# Patient Record
Sex: FEMALE | Race: BLACK OR AFRICAN AMERICAN | NOT HISPANIC OR LATINO | Employment: UNEMPLOYED | URBAN - METROPOLITAN AREA
[De-identification: names, ages, dates, MRNs, and addresses within clinical notes are randomized per-mention and may not be internally consistent; named-entity substitution may affect disease eponyms.]

---

## 2023-09-14 ENCOUNTER — HOSPITAL ENCOUNTER (INPATIENT)
Facility: CLINIC | Age: 33
LOS: 1 days | Discharge: HOME | End: 2023-09-15
Attending: EMERGENCY MEDICINE | Admitting: INTERNAL MEDICINE
Payer: MEDICAID

## 2023-09-14 ENCOUNTER — APPOINTMENT (OUTPATIENT)
Dept: CT IMAGING | Facility: CLINIC | Age: 33
End: 2023-09-14
Attending: EMERGENCY MEDICINE
Payer: MEDICAID

## 2023-09-14 DIAGNOSIS — K76.7 HEPATORENAL FAILURE (H): ICD-10-CM

## 2023-09-14 DIAGNOSIS — K76.7 HEPATORENAL SYNDROME (H): Primary | ICD-10-CM

## 2023-09-14 DIAGNOSIS — F10.10 ALCOHOL ABUSE: ICD-10-CM

## 2023-09-14 DIAGNOSIS — I10 HYPERTENSION, UNSPECIFIED TYPE: ICD-10-CM

## 2023-09-14 LAB
ALBUMIN SERPL BCG-MCNC: 5.6 G/DL (ref 3.5–5.2)
ALBUMIN UR-MCNC: 70 MG/DL
ALP SERPL-CCNC: 138 U/L (ref 35–104)
ALT SERPL W P-5'-P-CCNC: 907 U/L (ref 0–50)
ANION GAP SERPL CALCULATED.3IONS-SCNC: 17 MMOL/L (ref 7–15)
ANION GAP SERPL CALCULATED.3IONS-SCNC: 38 MMOL/L (ref 7–15)
APAP SERPL-MCNC: <5 UG/ML (ref 10–30)
APPEARANCE UR: CLEAR
AST SERPL W P-5'-P-CCNC: 1421 U/L (ref 0–45)
B-OH-BUTYR SERPL-SCNC: 4.3 MMOL/L
BASOPHILS # BLD AUTO: 0 10E3/UL (ref 0–0.2)
BASOPHILS NFR BLD AUTO: 0 %
BILIRUB SERPL-MCNC: 0.4 MG/DL
BILIRUB UR QL STRIP: ABNORMAL
BUN SERPL-MCNC: 21.9 MG/DL (ref 6–20)
BUN SERPL-MCNC: 31.9 MG/DL (ref 6–20)
CALCIUM SERPL-MCNC: 11.1 MG/DL (ref 8.6–10)
CALCIUM SERPL-MCNC: 8.3 MG/DL (ref 8.6–10)
CHLORIDE SERPL-SCNC: 81 MMOL/L (ref 98–107)
CHLORIDE SERPL-SCNC: 96 MMOL/L (ref 98–107)
COLOR UR AUTO: YELLOW
CREAT SERPL-MCNC: 1.18 MG/DL (ref 0.51–0.95)
CREAT SERPL-MCNC: 3.6 MG/DL (ref 0.51–0.95)
CREAT UR-MCNC: 210.2 MG/DL
DEPRECATED HCO3 PLAS-SCNC: 13 MMOL/L (ref 22–29)
DEPRECATED HCO3 PLAS-SCNC: 21 MMOL/L (ref 22–29)
EGFRCR SERPLBLD CKD-EPI 2021: 17 ML/MIN/1.73M2
EGFRCR SERPLBLD CKD-EPI 2021: 63 ML/MIN/1.73M2
EOSINOPHIL # BLD AUTO: 0 10E3/UL (ref 0–0.7)
EOSINOPHIL NFR BLD AUTO: 0 %
ERYTHROCYTE [DISTWIDTH] IN BLOOD BY AUTOMATED COUNT: 12.6 % (ref 10–15)
FRACT EXCRET NA UR+SERPL-RTO: 0.3 %
GLUCOSE SERPL-MCNC: 149 MG/DL (ref 70–99)
GLUCOSE SERPL-MCNC: 78 MG/DL (ref 70–99)
GLUCOSE UR STRIP-MCNC: NEGATIVE MG/DL
HCT VFR BLD AUTO: 47.9 % (ref 35–47)
HGB BLD-MCNC: 16.8 G/DL (ref 11.7–15.7)
HGB UR QL STRIP: ABNORMAL
IMM GRANULOCYTES # BLD: 0 10E3/UL
IMM GRANULOCYTES NFR BLD: 0 %
INR PPP: 0.93 (ref 0.85–1.15)
KETONES UR STRIP-MCNC: 100 MG/DL
LACTATE SERPL-SCNC: 0.9 MMOL/L (ref 0.7–2)
LEUKOCYTE ESTERASE UR QL STRIP: NEGATIVE
LIPASE SERPL-CCNC: 39 U/L (ref 13–60)
LYMPHOCYTES # BLD AUTO: 1.3 10E3/UL (ref 0.8–5.3)
LYMPHOCYTES NFR BLD AUTO: 21 %
MAGNESIUM SERPL-MCNC: 2 MG/DL (ref 1.7–2.3)
MCH RBC QN AUTO: 31.2 PG (ref 26.5–33)
MCHC RBC AUTO-ENTMCNC: 35.1 G/DL (ref 31.5–36.5)
MCV RBC AUTO: 89 FL (ref 78–100)
MONOCYTES # BLD AUTO: 0.4 10E3/UL (ref 0–1.3)
MONOCYTES NFR BLD AUTO: 6 %
MUCOUS THREADS #/AREA URNS LPF: PRESENT /LPF
NEUTROPHILS # BLD AUTO: 4.6 10E3/UL (ref 1.6–8.3)
NEUTROPHILS NFR BLD AUTO: 73 %
NITRATE UR QL: NEGATIVE
NRBC # BLD AUTO: 0 10E3/UL
NRBC BLD AUTO-RTO: 0 /100
PH UR STRIP: 6 [PH] (ref 5–7)
PHOSPHATE SERPL-MCNC: 5.3 MG/DL (ref 2.5–4.5)
PLATELET # BLD AUTO: 271 10E3/UL (ref 150–450)
POTASSIUM SERPL-SCNC: 3.1 MMOL/L (ref 3.4–5.3)
POTASSIUM SERPL-SCNC: 3.3 MMOL/L (ref 3.4–5.3)
POTASSIUM SERPL-SCNC: 3.4 MMOL/L (ref 3.4–5.3)
PROT SERPL-MCNC: 10.2 G/DL (ref 6.4–8.3)
RBC # BLD AUTO: 5.38 10E6/UL (ref 3.8–5.2)
RBC URINE: 1 /HPF
SODIUM SERPL-SCNC: 132 MMOL/L (ref 136–145)
SODIUM SERPL-SCNC: 134 MMOL/L (ref 136–145)
SODIUM UR-SCNC: 72 MMOL/L
SP GR UR STRIP: 1.02 (ref 1–1.03)
SQUAMOUS EPITHELIAL: 5 /HPF
UROBILINOGEN UR STRIP-MCNC: 2 MG/DL
WBC # BLD AUTO: 6.3 10E3/UL (ref 4–11)
WBC URINE: 1 /HPF

## 2023-09-14 PROCEDURE — 80053 COMPREHEN METABOLIC PANEL: CPT | Performed by: EMERGENCY MEDICINE

## 2023-09-14 PROCEDURE — 85610 PROTHROMBIN TIME: CPT | Performed by: EMERGENCY MEDICINE

## 2023-09-14 PROCEDURE — 250N000011 HC RX IP 250 OP 636: Mod: JZ | Performed by: PHYSICIAN ASSISTANT

## 2023-09-14 PROCEDURE — 86708 HEPATITIS A ANTIBODY: CPT | Performed by: INTERNAL MEDICINE

## 2023-09-14 PROCEDURE — 82310 ASSAY OF CALCIUM: CPT | Performed by: INTERNAL MEDICINE

## 2023-09-14 PROCEDURE — 36415 COLL VENOUS BLD VENIPUNCTURE: CPT | Performed by: INTERNAL MEDICINE

## 2023-09-14 PROCEDURE — 86803 HEPATITIS C AB TEST: CPT | Performed by: INTERNAL MEDICINE

## 2023-09-14 PROCEDURE — 99285 EMERGENCY DEPT VISIT HI MDM: CPT | Mod: 25

## 2023-09-14 PROCEDURE — 84100 ASSAY OF PHOSPHORUS: CPT | Performed by: PHYSICIAN ASSISTANT

## 2023-09-14 PROCEDURE — 74176 CT ABD & PELVIS W/O CONTRAST: CPT

## 2023-09-14 PROCEDURE — 81001 URINALYSIS AUTO W/SCOPE: CPT | Performed by: INTERNAL MEDICINE

## 2023-09-14 PROCEDURE — 250N000009 HC RX 250: Performed by: PHYSICIAN ASSISTANT

## 2023-09-14 PROCEDURE — 250N000013 HC RX MED GY IP 250 OP 250 PS 637: Performed by: PHYSICIAN ASSISTANT

## 2023-09-14 PROCEDURE — 250N000011 HC RX IP 250 OP 636: Mod: JZ | Performed by: EMERGENCY MEDICINE

## 2023-09-14 PROCEDURE — 82010 KETONE BODYS QUAN: CPT | Performed by: INTERNAL MEDICINE

## 2023-09-14 PROCEDURE — 96374 THER/PROPH/DIAG INJ IV PUSH: CPT

## 2023-09-14 PROCEDURE — 83605 ASSAY OF LACTIC ACID: CPT | Performed by: EMERGENCY MEDICINE

## 2023-09-14 PROCEDURE — 36415 COLL VENOUS BLD VENIPUNCTURE: CPT | Performed by: EMERGENCY MEDICINE

## 2023-09-14 PROCEDURE — 83735 ASSAY OF MAGNESIUM: CPT | Performed by: EMERGENCY MEDICINE

## 2023-09-14 PROCEDURE — 80143 DRUG ASSAY ACETAMINOPHEN: CPT | Performed by: INTERNAL MEDICINE

## 2023-09-14 PROCEDURE — 96361 HYDRATE IV INFUSION ADD-ON: CPT

## 2023-09-14 PROCEDURE — 86706 HEP B SURFACE ANTIBODY: CPT | Performed by: INTERNAL MEDICINE

## 2023-09-14 PROCEDURE — 86704 HEP B CORE ANTIBODY TOTAL: CPT | Performed by: INTERNAL MEDICINE

## 2023-09-14 PROCEDURE — 258N000003 HC RX IP 258 OP 636: Performed by: PHYSICIAN ASSISTANT

## 2023-09-14 PROCEDURE — 84132 ASSAY OF SERUM POTASSIUM: CPT | Performed by: INTERNAL MEDICINE

## 2023-09-14 PROCEDURE — 85025 COMPLETE CBC W/AUTO DIFF WBC: CPT | Performed by: EMERGENCY MEDICINE

## 2023-09-14 PROCEDURE — 83690 ASSAY OF LIPASE: CPT | Performed by: EMERGENCY MEDICINE

## 2023-09-14 PROCEDURE — 96375 TX/PRO/DX INJ NEW DRUG ADDON: CPT

## 2023-09-14 PROCEDURE — C9113 INJ PANTOPRAZOLE SODIUM, VIA: HCPCS | Mod: JZ | Performed by: PHYSICIAN ASSISTANT

## 2023-09-14 PROCEDURE — 250N000013 HC RX MED GY IP 250 OP 250 PS 637: Performed by: INTERNAL MEDICINE

## 2023-09-14 PROCEDURE — 99223 1ST HOSP IP/OBS HIGH 75: CPT | Mod: FS | Performed by: PHYSICIAN ASSISTANT

## 2023-09-14 PROCEDURE — 258N000003 HC RX IP 258 OP 636: Performed by: EMERGENCY MEDICINE

## 2023-09-14 PROCEDURE — 87340 HEPATITIS B SURFACE AG IA: CPT | Performed by: INTERNAL MEDICINE

## 2023-09-14 PROCEDURE — 120N000001 HC R&B MED SURG/OB

## 2023-09-14 PROCEDURE — 86709 HEPATITIS A IGM ANTIBODY: CPT | Performed by: INTERNAL MEDICINE

## 2023-09-14 PROCEDURE — 84300 ASSAY OF URINE SODIUM: CPT | Performed by: INTERNAL MEDICINE

## 2023-09-14 RX ORDER — AMOXICILLIN 250 MG
2 CAPSULE ORAL 2 TIMES DAILY PRN
Status: DISCONTINUED | OUTPATIENT
Start: 2023-09-14 | End: 2023-09-15 | Stop reason: HOSPADM

## 2023-09-14 RX ORDER — TRAZODONE HYDROCHLORIDE 50 MG/1
50 TABLET, FILM COATED ORAL
Status: DISCONTINUED | OUTPATIENT
Start: 2023-09-14 | End: 2023-09-15 | Stop reason: HOSPADM

## 2023-09-14 RX ORDER — SUCRALFATE 1 G/1
1 TABLET ORAL
Status: DISCONTINUED | OUTPATIENT
Start: 2023-09-14 | End: 2023-09-15 | Stop reason: HOSPADM

## 2023-09-14 RX ORDER — ONDANSETRON 2 MG/ML
4 INJECTION INTRAMUSCULAR; INTRAVENOUS EVERY 6 HOURS PRN
Status: DISCONTINUED | OUTPATIENT
Start: 2023-09-14 | End: 2023-09-15 | Stop reason: HOSPADM

## 2023-09-14 RX ORDER — POLYETHYLENE GLYCOL 3350 17 G/17G
17 POWDER, FOR SOLUTION ORAL DAILY PRN
Status: DISCONTINUED | OUTPATIENT
Start: 2023-09-14 | End: 2023-09-15 | Stop reason: HOSPADM

## 2023-09-14 RX ORDER — HYDROMORPHONE HYDROCHLORIDE 1 MG/ML
0.5 INJECTION, SOLUTION INTRAMUSCULAR; INTRAVENOUS; SUBCUTANEOUS ONCE
Status: COMPLETED | OUTPATIENT
Start: 2023-09-14 | End: 2023-09-14

## 2023-09-14 RX ORDER — FOLIC ACID 1 MG/1
1 TABLET ORAL DAILY
Status: DISCONTINUED | OUTPATIENT
Start: 2023-09-15 | End: 2023-09-15 | Stop reason: HOSPADM

## 2023-09-14 RX ORDER — POTASSIUM CHLORIDE 1500 MG/1
40 TABLET, EXTENDED RELEASE ORAL ONCE
Status: COMPLETED | OUTPATIENT
Start: 2023-09-14 | End: 2023-09-14

## 2023-09-14 RX ORDER — LIDOCAINE 40 MG/G
CREAM TOPICAL
Status: DISCONTINUED | OUTPATIENT
Start: 2023-09-14 | End: 2023-09-15 | Stop reason: HOSPADM

## 2023-09-14 RX ORDER — SODIUM CHLORIDE 9 MG/ML
INJECTION, SOLUTION INTRAVENOUS ONCE
Status: COMPLETED | OUTPATIENT
Start: 2023-09-14 | End: 2023-09-14

## 2023-09-14 RX ORDER — AMOXICILLIN 250 MG
1 CAPSULE ORAL 2 TIMES DAILY PRN
Status: DISCONTINUED | OUTPATIENT
Start: 2023-09-14 | End: 2023-09-15 | Stop reason: HOSPADM

## 2023-09-14 RX ORDER — ONDANSETRON 4 MG/1
4 TABLET, ORALLY DISINTEGRATING ORAL EVERY 6 HOURS PRN
Status: DISCONTINUED | OUTPATIENT
Start: 2023-09-14 | End: 2023-09-15 | Stop reason: HOSPADM

## 2023-09-14 RX ORDER — MULTIPLE VITAMINS W/ MINERALS TAB 9MG-400MCG
1 TAB ORAL DAILY
Status: DISCONTINUED | OUTPATIENT
Start: 2023-09-15 | End: 2023-09-15 | Stop reason: HOSPADM

## 2023-09-14 RX ORDER — DEXTROSE MONOHYDRATE, SODIUM CHLORIDE, AND POTASSIUM CHLORIDE 50; 1.49; 4.5 G/1000ML; G/1000ML; G/1000ML
INJECTION, SOLUTION INTRAVENOUS CONTINUOUS
Status: DISCONTINUED | OUTPATIENT
Start: 2023-09-15 | End: 2023-09-15 | Stop reason: HOSPADM

## 2023-09-14 RX ORDER — SODIUM CHLORIDE, SODIUM LACTATE, POTASSIUM CHLORIDE, CALCIUM CHLORIDE 600; 310; 30; 20 MG/100ML; MG/100ML; MG/100ML; MG/100ML
INJECTION, SOLUTION INTRAVENOUS CONTINUOUS
Status: CANCELLED | OUTPATIENT
Start: 2023-09-14 | End: 2023-09-15

## 2023-09-14 RX ORDER — ONDANSETRON 2 MG/ML
4 INJECTION INTRAMUSCULAR; INTRAVENOUS ONCE
Status: COMPLETED | OUTPATIENT
Start: 2023-09-14 | End: 2023-09-14

## 2023-09-14 RX ADMIN — SODIUM CHLORIDE 1000 ML: 9 INJECTION, SOLUTION INTRAVENOUS at 04:46

## 2023-09-14 RX ADMIN — POTASSIUM CHLORIDE 40 MEQ: 1500 TABLET, EXTENDED RELEASE ORAL at 18:13

## 2023-09-14 RX ADMIN — SUCRALFATE 1 G: 1 TABLET ORAL at 21:06

## 2023-09-14 RX ADMIN — SODIUM CHLORIDE 1000 ML: 9 INJECTION, SOLUTION INTRAVENOUS at 14:00

## 2023-09-14 RX ADMIN — HYDROMORPHONE HYDROCHLORIDE 0.5 MG: 1 INJECTION, SOLUTION INTRAMUSCULAR; INTRAVENOUS; SUBCUTANEOUS at 04:45

## 2023-09-14 RX ADMIN — SUCRALFATE 1 G: 1 TABLET ORAL at 16:29

## 2023-09-14 RX ADMIN — PANTOPRAZOLE SODIUM 40 MG: 40 INJECTION, POWDER, FOR SOLUTION INTRAVENOUS at 20:09

## 2023-09-14 RX ADMIN — SODIUM CHLORIDE: 9 INJECTION, SOLUTION INTRAVENOUS at 07:27

## 2023-09-14 RX ADMIN — FOLIC ACID: 5 INJECTION, SOLUTION INTRAMUSCULAR; INTRAVENOUS; SUBCUTANEOUS at 15:03

## 2023-09-14 RX ADMIN — ONDANSETRON 4 MG: 2 INJECTION INTRAMUSCULAR; INTRAVENOUS at 04:45

## 2023-09-14 RX ADMIN — TRAZODONE HYDROCHLORIDE 50 MG: 50 TABLET ORAL at 21:05

## 2023-09-14 ASSESSMENT — ACTIVITIES OF DAILY LIVING (ADL)
DIFFICULTY_EATING/SWALLOWING: NO
DRESSING/BATHING_DIFFICULTY: NO
ADLS_ACUITY_SCORE: 20
ADLS_ACUITY_SCORE: 37
ADLS_ACUITY_SCORE: 35
CHANGE_IN_FUNCTIONAL_STATUS_SINCE_ONSET_OF_CURRENT_ILLNESS/INJURY: NO
ADLS_ACUITY_SCORE: 35
WALKING_OR_CLIMBING_STAIRS_DIFFICULTY: NO
CONCENTRATING,_REMEMBERING_OR_MAKING_DECISIONS_DIFFICULTY: NO
FALL_HISTORY_WITHIN_LAST_SIX_MONTHS: NO
ADLS_ACUITY_SCORE: 35
ADLS_ACUITY_SCORE: 37
ADLS_ACUITY_SCORE: 35
ADLS_ACUITY_SCORE: 37
WEAR_GLASSES_OR_BLIND: NO
DOING_ERRANDS_INDEPENDENTLY_DIFFICULTY: NO
ADLS_ACUITY_SCORE: 35
ADLS_ACUITY_SCORE: 20
TOILETING_ISSUES: NO

## 2023-09-14 NOTE — H&P
[facility]    History and Physical - Hospitalist Service       Date of Admission:  9/14/2023    Assessment & Plan      Karlo Fuentes is a 32 year old female with hx of alcohol abuse, who was admitted on 9/14/2023 with abdominal pain and vomiting.     1. Acute kidney injury, suspect prerenal  Acute alcohol hepatitis  Possible hepatorenal syndrome  Anion gap metabolic acidosis   Abdominal pain, poss alcohol gastritis  Hx of alcohol abuse, drinks heavily most days. Abd pain and vomiting for past 4 days, unable to keep anything down, no alcohol for past 4 days. Notes occasional nicotine and marijuana use.  Hx of pancreatitis, sx feel similar.   Lipase normal, no evidence of pancreatitis on CT scan  Cr 3.6 (no previous data to compare). , AST 1421, alk phos 138, total bilirubin normal, lactic acid normal. Anion gap 38.   Given 1L NS bolus, 0.5 mg IV dilaudid and 4 mg zofran in ED.  - admit to inpatient   - suspect prerenal MILDRED with acute alcohol hepatitis given Na of 132, BUN 31 and Hgb elevated at 16.1 likely due to hemoconcentration. INR normal at 0.93. No PO intake for 4 days in setting of alcohol abuse. No evidence of pancreatitis or cirrhosis on CT, no other acute process noted  - Give additional 1L NS bolus, 1L banana bag  - full liquid diet as tolerated  - GI consult  - recheck CMP in AM  - IV PPI BID for poss gastritis  - avoid Tylenol for now  - carafate with meals   - low suspicion for alcohol withdrawal as she has not drank for 4 days per report. Will monitor on CIWA, if scoring, please page provider to add meds for sx     Diet:  full liquid diet  DVT Prophylaxis: Pneumatic Compression Devices  Arguelles Catheter: Not present  Lines: None     Cardiac Monitoring: None  Code Status:  full code    Clinically Significant Risk Factors Present on Admission           # Hypercalcemia: Highest Ca = 11.1 mg/dL in last 2 days, will monitor as appropriate   # Anion Gap Metabolic Acidosis:  Highest Anion Gap = 38 mmol/L in last 2 days, will monitor and treat as appropriate      # Hypertension: Noted on problem list                 Disposition Plan      Expected Discharge Date: 09/16/2023                The patient's care was discussed with the Attending Physician, Dr. Chapin, Patient, and ED provider, Dr. Becker .    Teresa Weinstein PA-C  Hospitalist Service  [facility]  Securely message with [employer] (more info)  Text page via [facility] Paging/Directory     ______________________________________________________________________    Chief Complaint   Abdominal pain, vomiting    History is obtained from the patient    History of Present Illness   Karlo Fuentes is a 32 year old female who presents to the ED with 4 days of abdominal pain with nausea and vomiting.  Patient reports a history of chronic alcohol use, often drinking a 750 ml bottle of liquor per day.  She did denies fever or chills.  She notes she has been unable to keep anything down for the past 4 days and has not drink alcohol during this time as well.  She notes a history of alcohol withdrawal but denies alcohol withdrawal seizures.  She does not feel that she is having withdrawal symptoms at this time.  She notes similar abdominal pain in the past related to acute pancreatitis.  She denies diarrhea or dysuria.  She has not previously been to a doctor.      Past Medical History    Alcohol abuse    Past Surgical History   No surgical hx    Prior to Admission Medications   None           Physical Exam   Vital Signs: Temp: 97.1  F (36.2  C) Temp src: Temporal BP: (!) 135/99 Pulse: 96   Resp: 20 SpO2: 100 % O2 Device: None (Room air)    Weight: 135 lbs 0 oz    GENERAL:  Comfortable.  PSYCH: pleasant, oriented, No acute distress.  HEENT:  Atraumatic, normocephalic. Normal conjunctiva, normal hearing, and oropharynx is normal.  NECK:  Supple, no neck vein distention  HEART:  Normal S1, S2 with no murmur, no pericardial  rub, gallops or S3 or S4.  LUNGS:  Clear to auscultation, normal Respiratory effort. No wheezing, rales or ronchi.  GI:  Soft, normal bowel sounds. Epigastric abd tenderness, non distended.   EXTREMITIES:  No pedal edema, +2 pulses bilateral and equal.  SKIN:  Dry to touch, No rash, wound or ulcerations.  NEUROLOGIC:  CN 2-12 intact, BL 5/5 symmetric upper and lower extremity strength, sensation is intact with no focal deficits.      Medical Decision Making       75+ MINUTES SPENT BY ME on the date of service doing chart review, history, exam, documentation & further activities per the note.      Data     I have personally reviewed the following data over the past 24 hrs:    6.3  \   16.8 (H)   / 271     132 (L) 81 (L) 31.9 (H) /  149 (H)   3.4 13 (L) 3.60 (H) \     ALT: 907 (HH) AST: 1,421 (HH) AP: 138 (H) TBILI: 0.4   ALB: 5.6 (H) TOT PROTEIN: 10.2 (H) LIPASE: 39     Procal: N/A CRP: N/A Lactic Acid: 0.9       INR:  0.93 PTT:  N/A   D-dimer:  N/A Fibrinogen:  N/A       Imaging results reviewed over the past 24 hrs:   Recent Results (from the past 24 hour(s))   CT Abdomen Pelvis w/o Contrast    Narrative    EXAM: CT ABDOMEN PELVIS W/O CONTRAST  LOCATION: [facility]  DATE: 9/14/2023    INDICATION: epigastric pain; concern for pancreatitis  COMPARISON: None.  TECHNIQUE: CT scan of the abdomen and pelvis was performed without IV contrast. Multiplanar reformats were obtained. Dose reduction techniques were used.  CONTRAST: None.    FINDINGS:   LOWER CHEST: Normal.    HEPATOBILIARY: Diffuse fatty infiltration of the liver.    PANCREAS: Normal.    SPLEEN: Normal.    ADRENAL GLANDS: Normal.    KIDNEYS/BLADDER: Normal.    BOWEL: There is no bowel obstruction or inflammation. The appendix is normal. No free intraperitoneal gas or fluid.    LYMPH NODES: Normal.    VASCULATURE: Unremarkable.    PELVIC ORGANS: Normal.    MUSCULOSKELETAL: Normal.      Impression    IMPRESSION:   1.  No acute abnormality.  No evidence of acute pancreatitis.  2.  Diffuse fatty infiltration of the liver.

## 2023-09-14 NOTE — ED PROVIDER NOTES
"  History     Chief Complaint:  Abdominal Pain, Vomiting, and Alcohol Problem       HPI   Karlo Fuentes is a 32 year old female who presents to the ED due to abdominal pain.  The patient states that for the last 4 days she has been having epigastric pain that seems to radiate to the right side of her abdomen.  She notes a prior history of pancreatitis and states that when she had that it felt more severe but this felt like the early episodes of that.  The patient also goes on to state that her last alcohol drink was Sunday.  She states that normally she does not drink very much alcohol regularly but recently she has.  She denies any history of alcohol withdrawal seizures but states that sometimes she does have shakes if she does not drink.    The patient's mother is concerned because for the last several days the patient has not been able to sleep and has felt poorly.  The patient's mother also notes that anytime the patient tries to drink water she vomits.  He also states that the patient drinks very heavily very frequently.      Independent Historian:    Mother - They report and corroborate the above HPI    Review of External Notes:  Attempted to review records in epic though no old records are available for review.    Allergies:  No Known Allergies     Physical Exam   Patient Vitals for the past 24 hrs:   BP Temp Temp src Pulse Resp SpO2 Height Weight   09/14/23 0630 (!) 136/101 -- -- -- -- -- -- --   09/14/23 0600 (!) 134/101 -- -- 94 -- 100 % -- --   09/14/23 0530 (!) 139/104 -- -- 95 -- 100 % -- --   09/14/23 0500 (!) 140/104 -- -- 89 -- 99 % -- --   09/14/23 0424 -- -- -- -- -- -- 1.626 m (5' 4\") 61.2 kg (135 lb)   09/14/23 0421 (!) 130/105 97.1  F (36.2  C) Temporal (!) 140 20 99 % -- --        Physical Exam  Constitutional: Vital signs reviewed as above.   HENT:    Head: No external signs of trauma noted.   Eyes: Conjunctivae are normal. Pupils are equal, round, and reactive to light.   Cardiovascular: "    Tachycardic rate, regular rhythm and normal heart sounds.     Exam reveals no friction rub.     No murmur heard.  Pulmonary/Chest:    Effort normal and breath sounds normal.    No respiratory distress.    There are no wheezes.    There are no rales.   Gastrointestinal:    Soft.    Bowel sounds normal.    There is no distension.    There is focal epigastric tenderness.    There is no rebound or guarding.   Musculoskeletal:    Normal range of motion.    Normal Tone  Neurological: Patient is alert and oriented to person, place, and time. No tremors noted  Skin: Skin is warm and dry. Patient is not diaphoretic      Emergency Department Course     Imaging:  CT Abdomen Pelvis w/o Contrast   Final Result   IMPRESSION:    1.  No acute abnormality. No evidence of acute pancreatitis.   2.  Diffuse fatty infiltration of the liver.            Report per radiology    Laboratory:  Labs Ordered and Resulted from Time of ED Arrival to Time of ED Departure   COMPREHENSIVE METABOLIC PANEL - Abnormal       Result Value    Sodium 132 (*)     Potassium 3.4      Chloride 81 (*)     Carbon Dioxide (CO2) 13 (*)     Anion Gap 38 (*)     Urea Nitrogen 31.9 (*)     Creatinine 3.60 (*)     Calcium 11.1 (*)     Glucose 149 (*)     Alkaline Phosphatase 138 (*)     AST 1,421 (*)      (*)     Protein Total 10.2 (*)     Albumin 5.6 (*)     Bilirubin Total 0.4      GFR Estimate 17 (*)    CBC WITH PLATELETS AND DIFFERENTIAL - Abnormal    WBC Count 6.3      RBC Count 5.38 (*)     Hemoglobin 16.8 (*)     Hematocrit 47.9 (*)     MCV 89      MCH 31.2      MCHC 35.1      RDW 12.6      Platelet Count 271      % Neutrophils 73      % Lymphocytes 21      % Monocytes 6      % Eosinophils 0      % Basophils 0      % Immature Granulocytes 0      NRBCs per 100 WBC 0      Absolute Neutrophils 4.6      Absolute Lymphocytes 1.3      Absolute Monocytes 0.4      Absolute Eosinophils 0.0      Absolute Basophils 0.0      Absolute Immature Granulocytes 0.0       Absolute NRBCs 0.0     LIPASE - Normal    Lipase 39     MAGNESIUM - Normal    Magnesium 2.0     LACTIC ACID WHOLE BLOOD - Normal    Lactic Acid 0.9     INR        Procedures       Emergency Department Course & Assessments:             Interventions:  Medications   sodium chloride 0.9% BOLUS 1,000 mL (0 mLs Intravenous Stopped 9/14/23 0628)   HYDROmorphone (PF) (DILAUDID) injection 0.5 mg (0.5 mg Intravenous $Given 9/14/23 0445)   ondansetron (ZOFRAN) injection 4 mg (4 mg Intravenous $Given 9/14/23 0445)        Assessments, Independent Interpretation, Consult/Discussion of ManagementTests:  ED Course as of 09/14/23 0701   Thu Sep 14, 2023   0428 I evaluated the patient   0604 D/W Dr. Zuluaga. Would like discussion with [facility] about admission there vs here.   0659 Updated patient and mother.       Social Determinants of Health affecting care:  None    Disposition:  Care of the patient was transferred to my colleague Dr. Becker pending admission.     Impression & Plan    CMS Diagnoses: None    Code Status: No Order    Medical Decision Making:  This 40-year-old female patient presents the ED due to abdominal pain.  Please see the HPI and exam for specifics.  A broad differential was considered (hepatitis, pancreatitis, bowel obstruction, infectious etiology, etc. ) leading to the work-up above.    Findings are notable for hepatorenal syndrome.  Fortunately, there does not appear to be pancreatitis despite the patient's discomfort.  Her heart rate improved with IV fluids.    The etiology of the symptoms is almost assuredly due to alcohol.  There was some concern about admitting the patient here at [facility] so we will reach out to the [facility] and gastroenterology about admission recommendations.    The patient was signed over to my colleague, Dr. Becker, for disposition pending admission discussion.    Critical Care time:  was 0 minutes for this patient excluding procedures.    Diagnosis:    ICD-10-CM     1. Hepatorenal failure (H)  K76.7       2. Alcohol abuse  F10.10            Discharge Medications:  New Prescriptions    No medications on file          9/14/2023   Jayjay Ramos DO Burns, Bradley Joseph, DO  09/14/23 0701

## 2023-09-14 NOTE — PROVIDER NOTIFICATION
DATE/TIME OF CALL RECEIVED FROM LAB:  09/14/23 at 5:52 PM   LAB TEST:  ketones  LAB VALUE:  4.3  PROVIDER NOTIFIED?: Yes  PROVIDER NAME: Hospitalists: Dr Chapin and Teresa Weinstein  DATE/TIME LAB VALUE REPORTED TO PROVIDER: 09/14/23 at 5:53 PM  MECHANISM OF PROVIDER NOTIFICATION: Vocera Page  PROVIDER RESPONSE: awaiting response

## 2023-09-14 NOTE — ED TRIAGE NOTES
Pt arrives with 4 days of abdominal pain and vomiting. Unable to keep anything down. History of pancreatitis. Pt points to upper abdominal for pain area.    Last drink Sunday.  Per mother pt drinks a lot of vodka. History of alcohol withdrawal. Denies any seizures.      Triage Assessment       Row Name 09/14/23 0420       Triage Assessment (Adult)    Airway WDL WDL       Respiratory WDL    Respiratory WDL WDL       Skin Circulation/Temperature WDL    Skin Circulation/Temperature WDL WDL       Peripheral/Neurovascular WDL    Peripheral Neurovascular WDL WDL       Cognitive/Neuro/Behavioral WDL    Cognitive/Neuro/Behavioral WDL X  fatigue

## 2023-09-14 NOTE — CONSULTS
GASTROENTEROLOGY CONSULTATION     Karlo Fuentes  [address]   [facility] [address]  32 year old female    Admission Date/Time: 9/14/2023  Primary Care Provider: No Ref-Primary, Physician    We were asked to see the patient in consultation by Dr. Chapin for evaluation of cirrhosis.        HPI:  Karlo Fuentes is a 32 year old female with a past medical history significant for alcohol use disorder and alcoholic pancreatitis who presented to the hospital with 4 days of abdominal pain, nausea and vomiting.  She is admitted with acute renal failure and abnormal LFTs.  She reports the abdominal pain started after drinking alcohol this past weekend, and the pain felt similar to when she had pancreatitis in 2016.  She denies use of Tylenol or recreational drugs except for marijuana on occasion.  She reports earlier today she took pepto-bismol, BC Powder x 2 (I looked this up and it is tylenol with NSAIDs), and 2 anti-nausea medications that her brother gave her.  She felt dizzy earlier today but did not pass out.    Her liver enzymes are significantly elevated with an AST of 1421 and an ALT of 907, total bilirubin is normal.  Alk phos is elevated at 138.  Her lipase is normal.  Creatinine is elevated at 3.60.  Cell count is normal, hemoglobin is elevated and is likely hemoconcentrated, platelets are normal.  INR is normal.  The CT scan of the abdomen and pelvis shows diffuse fatty infiltration of the liver, normal pancreas.    ROS: A comprehensive ten point review of systems was negative aside from those in mentioned in the HPI.      MEDICATIONS: No current facility-administered medications on file prior to encounter.  No current outpatient medications on file prior to encounter.      ALLERGIES: No Known Allergies    No past medical history on file.    No past surgical history on file.      SOCIAL HISTORY:       FAMILY HISTORY:  Reviewed in her chart    PHYSICAL EXAM:   BP (!) 138/105   Pulse 89   Temp 97.1  F  "(36.2  C) (Temporal)   Resp 20   Ht 1.626 m (5' 4\")   Wt 61.2 kg (135 lb)   LMP 09/14/2023   SpO2 100%   BMI 23.17 kg/m      Constitutional: NAD, comfortable  Cardiovascular: RRR  Respiratory: CTAB  Psychiatric: mentation appears normal and affect normal/bright  Head: Normocephalic. Atraumatic.    Neck: Neck supple. No adenopathy. Thyroid symmetric, normal size, trachea midline  Eyes:  no icterus  ENT: hearing adequate  Abdomen:   Soft, nt/nd, nabs  NEURO: grossly negative  SKIN: no suspicious lesions or rashes      ADDITIONAL COMMENTS:   I reviewed the patient's new clinical lab test results.   Recent Labs   Lab Test 09/14/23  0652 09/14/23 0442   WBC  --  6.3   HGB  --  16.8*   MCV  --  89   PLT  --  271   INR 0.93  --      Recent Labs   Lab Test 09/14/23 0442   *   POTASSIUM 3.4   CHLORIDE 81*   CO2 13*   BUN 31.9*   CR 3.60*   ANIONGAP 38*   DERICK 11.1*   *     Recent Labs   Lab Test 09/14/23 0442   ALBUMIN 5.6*   BILITOTAL 0.4   *   AST 1,421*   ALKPHOS 138*   LIPASE 39             .    CONSULTATION ASSESSMENT AND PLAN:    Alcohol use disorder.  History of alcoholic pancreatitis in 2016, not noted today on this admission.  Abnormal LFTs, specifically AST/ALT      The AST/ALT is significantly elevated and rarely can we attribute this significant elevation to alcohol use alone.  She denies chronic use of Tylenol, but I would still check a tylenol level today, and would have a low threshold to start NAC. This elevation in AST/ALT can also be seen with ischemic injury to the liver and she did report feeling dizzy earlier today so perhaps a transient low blood pressure could have caused this.  Another consideration is viral hepatitis and we will check hep A/B/C.    Elinor Dwyer MD  [place]    "

## 2023-09-14 NOTE — PHARMACY-ADMISSION MEDICATION HISTORY
Pharmacist Admission Medication History    Admission medication history is complete. The information provided in this note is only as accurate as the sources available at the time of the update.    Medication reconciliation/reorder completed by provider prior to medication history? No    Information Source(s): Patient and CareEverywhere/SureScripts via in-person    Pertinent Information:     Changes made to PTA medication list:  Added: None  Deleted: None  Changed: None    Medication Affordability:  Not including over the counter (OTC) medications, was there a time in the past 3 months when you did not take your medications as prescribed because of cost?: No    Allergies reviewed with patient and updates made in EHR: yes    Medication History Completed By: Anay Lopez RPH 9/14/2023 11:11 AM    Prior to Admission medications    Not on File

## 2023-09-14 NOTE — PLAN OF CARE
Goal Outcome Evaluation:      Plan of Care Reviewed With: patient    Overall Patient Progress: improvingOverall Patient Progress: improving     [facility]    ED Boarding Nurse Handoff Addendum Report:    Date/time: 9/14/2023, 1637    Activity Level: standby    Fall Risk: Yes:  nonskid shoes/slippers when out of bed, patient and family education, activity supervised, and room door open    Active Infusions: Infuvite Adult @ 100/hr    Current Meds Due: Scheduled carafate, scheduled protonix    Current care needs: Urine sample needed.     Oxygen requirements (liters/min and/or FiO2): None    Respiratory status: Room air    Vital signs (within last 30 minutes):    Vitals:    09/14/23 1130 09/14/23 1145 09/14/23 1200 09/14/23 1500   BP: 123/86 115/85 (!) 138/105 (!) 145/97   BP Location:    Right arm   Pulse: 87 93 89 85   Resp:    16   Temp:    97.5  F (36.4  C)   TempSrc:    Oral   SpO2: 100%  100% 99%   Weight:       Height:           Focused assessment:    A/Ox4. CIWA score of 1. LS clear. Abd pain 5/10, which patient reports is improved from this morning. Up with SBA.     GI following. Can have full liquid diet, but only tolerating sips of clears.     K replaced PO. Mag and Phos ordered for AM draws.     ED Boarding Nurse name: Jael Donato RN    RECEIVING UNIT ED HANDOFF REVIEW    Above ED Nurse Handoff Report was reviewed: Yes  Reviewed by: Alexandra Zavala RN on September 14, 2023 at 8:10 PM

## 2023-09-14 NOTE — ED NOTES
[facility]  ED Nurse Handoff Report    ED Chief complaint: Abdominal Pain, Vomiting, and Alcohol Problem  . ED Diagnosis:   Final diagnoses:   Hepatorenal failure (H)   Alcohol abuse   Hypertension, unspecified type       Allergies: No Known Allergies    Code Status: Full Code    Activity level - Baseline/Home:  independent.  Activity Level - Current:   assist of 1.   Lift room needed: No.   Bariatric: No   Needed: No   Isolation: No.   Infection: Not Applicable.     Respiratory status: Room air    Vital Signs (within 30 minutes):   Vitals:    09/14/23 0659 09/14/23 0714 09/14/23 0729 09/14/23 0744   BP: (!) 135/110 (!) 141/109 (!) 135/99    Pulse: 97 (!) 124 96    Resp:       Temp:       TempSrc:       SpO2: 100% 100% 100% 100%   Weight:       Height:           Cardiac Rhythm:  ,      Pain level:    Patient confused: No.   Patient Falls Risk: arm band in place.   Elimination Status: Has voided     Patient Report - Initial Complaint: Patient arrives with ongoing abdominal pain and unable to keep fluids down.   Focused Assessment: Patient arrives with abdominal pain for 4 days, nausea, vomiting. Patient has a hx of pancreatitis and ETOH abuse.      Abnormal Results:   Labs Ordered and Resulted from Time of ED Arrival to Time of ED Departure   COMPREHENSIVE METABOLIC PANEL - Abnormal       Result Value    Sodium 132 (*)     Potassium 3.4      Chloride 81 (*)     Carbon Dioxide (CO2) 13 (*)     Anion Gap 38 (*)     Urea Nitrogen 31.9 (*)     Creatinine 3.60 (*)     Calcium 11.1 (*)     Glucose 149 (*)     Alkaline Phosphatase 138 (*)     AST 1,421 (*)      (*)     Protein Total 10.2 (*)     Albumin 5.6 (*)     Bilirubin Total 0.4      GFR Estimate 17 (*)    CBC WITH PLATELETS AND DIFFERENTIAL - Abnormal    WBC Count 6.3      RBC Count 5.38 (*)     Hemoglobin 16.8 (*)     Hematocrit 47.9 (*)     MCV 89      MCH 31.2      MCHC 35.1      RDW 12.6      Platelet Count 271      %  Neutrophils 73      % Lymphocytes 21      % Monocytes 6      % Eosinophils 0      % Basophils 0      % Immature Granulocytes 0      NRBCs per 100 WBC 0      Absolute Neutrophils 4.6      Absolute Lymphocytes 1.3      Absolute Monocytes 0.4      Absolute Eosinophils 0.0      Absolute Basophils 0.0      Absolute Immature Granulocytes 0.0      Absolute NRBCs 0.0     LIPASE - Normal    Lipase 39     MAGNESIUM - Normal    Magnesium 2.0     LACTIC ACID WHOLE BLOOD - Normal    Lactic Acid 0.9     INR - Normal    INR 0.93          CT Abdomen Pelvis w/o Contrast   Final Result   IMPRESSION:    1.  No acute abnormality. No evidence of acute pancreatitis.   2.  Diffuse fatty infiltration of the liver.             Treatments provided: Medications  Family Comments: Family present  OBS brochure/video discussed/provided to patient:  N/A  ED Medications:   Medications   sodium chloride 0.9% BOLUS 1,000 mL (0 mLs Intravenous Stopped 9/14/23 0628)   HYDROmorphone (PF) (DILAUDID) injection 0.5 mg (0.5 mg Intravenous $Given 9/14/23 0445)   ondansetron (ZOFRAN) injection 4 mg (4 mg Intravenous $Given 9/14/23 0445)   sodium chloride 0.9% infusion ( Intravenous $New Bag 9/14/23 0406)       Drips infusing:  No  For the majority of the shift this patient was Green.   Interventions performed were None.    Sepsis treatment initiated: No    Cares/treatment/interventions/medications to be completed following ED care: Fluids    ED Nurse Name: Regi Angelo RN  7:45 AM     RECEIVING UNIT ED HANDOFF REVIEW    Above ED Nurse Handoff Report was reviewed: Yes  Reviewed by: Alexandra Zavala RN on September 14, 2023 at 8:13 PM

## 2023-09-15 VITALS
BODY MASS INDEX: 21.49 KG/M2 | DIASTOLIC BLOOD PRESSURE: 85 MMHG | OXYGEN SATURATION: 100 % | SYSTOLIC BLOOD PRESSURE: 140 MMHG | TEMPERATURE: 98.1 F | WEIGHT: 125.88 LBS | HEART RATE: 79 BPM | RESPIRATION RATE: 16 BRPM | HEIGHT: 64 IN

## 2023-09-15 PROBLEM — E88.89 ALCOHOLIC KETOSIS (H): Status: ACTIVE | Noted: 2023-09-15

## 2023-09-15 PROBLEM — N17.9 ACUTE RENAL FAILURE (H): Status: ACTIVE | Noted: 2023-09-14

## 2023-09-15 PROBLEM — K76.0 FATTY LIVER: Status: ACTIVE | Noted: 2023-09-15

## 2023-09-15 LAB
ALBUMIN SERPL BCG-MCNC: 3.2 G/DL (ref 3.5–5.2)
ALP SERPL-CCNC: 86 U/L (ref 35–104)
ALT SERPL W P-5'-P-CCNC: 576 U/L (ref 0–50)
ANION GAP SERPL CALCULATED.3IONS-SCNC: 10 MMOL/L (ref 7–15)
AST SERPL W P-5'-P-CCNC: 834 U/L (ref 0–45)
BASOPHILS # BLD AUTO: 0 10E3/UL (ref 0–0.2)
BASOPHILS NFR BLD AUTO: 0 %
BILIRUB SERPL-MCNC: 0.7 MG/DL
BUN SERPL-MCNC: 10.8 MG/DL (ref 6–20)
CALCIUM SERPL-MCNC: 9 MG/DL (ref 8.6–10)
CHLORIDE SERPL-SCNC: 101 MMOL/L (ref 98–107)
CREAT SERPL-MCNC: 0.79 MG/DL (ref 0.51–0.95)
DEPRECATED HCO3 PLAS-SCNC: 23 MMOL/L (ref 22–29)
EGFRCR SERPLBLD CKD-EPI 2021: >90 ML/MIN/1.73M2
EOSINOPHIL # BLD AUTO: 0.1 10E3/UL (ref 0–0.7)
EOSINOPHIL NFR BLD AUTO: 2 %
ERYTHROCYTE [DISTWIDTH] IN BLOOD BY AUTOMATED COUNT: 12.5 % (ref 10–15)
GLUCOSE SERPL-MCNC: 118 MG/DL (ref 70–99)
HCT VFR BLD AUTO: 33.6 % (ref 35–47)
HGB BLD-MCNC: 11.5 G/DL (ref 11.7–15.7)
IMM GRANULOCYTES # BLD: 0 10E3/UL
IMM GRANULOCYTES NFR BLD: 0 %
LYMPHOCYTES # BLD AUTO: 1.3 10E3/UL (ref 0.8–5.3)
LYMPHOCYTES NFR BLD AUTO: 41 %
MAGNESIUM SERPL-MCNC: 1.9 MG/DL (ref 1.7–2.3)
MCH RBC QN AUTO: 31.1 PG (ref 26.5–33)
MCHC RBC AUTO-ENTMCNC: 34.2 G/DL (ref 31.5–36.5)
MCV RBC AUTO: 91 FL (ref 78–100)
MONOCYTES # BLD AUTO: 0.2 10E3/UL (ref 0–1.3)
MONOCYTES NFR BLD AUTO: 7 %
NEUTROPHILS # BLD AUTO: 1.6 10E3/UL (ref 1.6–8.3)
NEUTROPHILS NFR BLD AUTO: 50 %
NRBC # BLD AUTO: 0 10E3/UL
NRBC BLD AUTO-RTO: 0 /100
PHOSPHATE SERPL-MCNC: 2.8 MG/DL (ref 2.5–4.5)
PLATELET # BLD AUTO: 160 10E3/UL (ref 150–450)
POTASSIUM SERPL-SCNC: 3.9 MMOL/L (ref 3.4–5.3)
POTASSIUM SERPL-SCNC: 3.9 MMOL/L (ref 3.4–5.3)
PROT SERPL-MCNC: 6.4 G/DL (ref 6.4–8.3)
RBC # BLD AUTO: 3.7 10E6/UL (ref 3.8–5.2)
SODIUM SERPL-SCNC: 134 MMOL/L (ref 136–145)
WBC # BLD AUTO: 3.3 10E3/UL (ref 4–11)

## 2023-09-15 PROCEDURE — 250N000013 HC RX MED GY IP 250 OP 250 PS 637: Performed by: INTERNAL MEDICINE

## 2023-09-15 PROCEDURE — 250N000013 HC RX MED GY IP 250 OP 250 PS 637: Performed by: PHYSICIAN ASSISTANT

## 2023-09-15 PROCEDURE — 85025 COMPLETE CBC W/AUTO DIFF WBC: CPT | Performed by: PHYSICIAN ASSISTANT

## 2023-09-15 PROCEDURE — 80053 COMPREHEN METABOLIC PANEL: CPT | Performed by: PHYSICIAN ASSISTANT

## 2023-09-15 PROCEDURE — 99238 HOSP IP/OBS DSCHRG MGMT 30/<: CPT | Performed by: INTERNAL MEDICINE

## 2023-09-15 PROCEDURE — 84132 ASSAY OF SERUM POTASSIUM: CPT | Performed by: PHYSICIAN ASSISTANT

## 2023-09-15 PROCEDURE — 258N000003 HC RX IP 258 OP 636: Performed by: INTERNAL MEDICINE

## 2023-09-15 PROCEDURE — 250N000011 HC RX IP 250 OP 636: Performed by: PHYSICIAN ASSISTANT

## 2023-09-15 PROCEDURE — 36415 COLL VENOUS BLD VENIPUNCTURE: CPT | Performed by: PHYSICIAN ASSISTANT

## 2023-09-15 PROCEDURE — C9113 INJ PANTOPRAZOLE SODIUM, VIA: HCPCS | Mod: JZ | Performed by: PHYSICIAN ASSISTANT

## 2023-09-15 PROCEDURE — 84100 ASSAY OF PHOSPHORUS: CPT | Performed by: INTERNAL MEDICINE

## 2023-09-15 PROCEDURE — 83735 ASSAY OF MAGNESIUM: CPT | Performed by: INTERNAL MEDICINE

## 2023-09-15 RX ORDER — MAGNESIUM OXIDE 400 MG/1
400 TABLET ORAL EVERY 4 HOURS
Status: COMPLETED | OUTPATIENT
Start: 2023-09-15 | End: 2023-09-15

## 2023-09-15 RX ORDER — POTASSIUM CHLORIDE 1500 MG/1
40 TABLET, EXTENDED RELEASE ORAL ONCE
Status: COMPLETED | OUTPATIENT
Start: 2023-09-15 | End: 2023-09-15

## 2023-09-15 RX ADMIN — THIAMINE HCL TAB 100 MG 100 MG: 100 TAB at 10:08

## 2023-09-15 RX ADMIN — MAGNESIUM OXIDE TAB 400 MG (241.3 MG ELEMENTAL MG) 400 MG: 400 (241.3 MG) TAB at 05:05

## 2023-09-15 RX ADMIN — MULTIPLE VITAMINS W/ MINERALS TAB 1 TABLET: TAB at 10:08

## 2023-09-15 RX ADMIN — POTASSIUM CHLORIDE, DEXTROSE MONOHYDRATE AND SODIUM CHLORIDE: 150; 5; 450 INJECTION, SOLUTION INTRAVENOUS at 10:10

## 2023-09-15 RX ADMIN — PANTOPRAZOLE SODIUM 40 MG: 40 INJECTION, POWDER, FOR SOLUTION INTRAVENOUS at 10:07

## 2023-09-15 RX ADMIN — POTASSIUM CHLORIDE, DEXTROSE MONOHYDRATE AND SODIUM CHLORIDE: 150; 5; 450 INJECTION, SOLUTION INTRAVENOUS at 00:42

## 2023-09-15 RX ADMIN — POTASSIUM CHLORIDE 40 MEQ: 1500 TABLET, EXTENDED RELEASE ORAL at 01:21

## 2023-09-15 RX ADMIN — MAGNESIUM OXIDE TAB 400 MG (241.3 MG ELEMENTAL MG) 400 MG: 400 (241.3 MG) TAB at 01:21

## 2023-09-15 RX ADMIN — FOLIC ACID 1 MG: 1 TABLET ORAL at 10:08

## 2023-09-15 RX ADMIN — SUCRALFATE 1 G: 1 TABLET ORAL at 10:08

## 2023-09-15 RX ADMIN — ONDANSETRON 4 MG: 4 TABLET, ORALLY DISINTEGRATING ORAL at 10:21

## 2023-09-15 RX ADMIN — MAGNESIUM OXIDE TAB 400 MG (241.3 MG ELEMENTAL MG) 400 MG: 400 (241.3 MG) TAB at 10:08

## 2023-09-15 ASSESSMENT — ACTIVITIES OF DAILY LIVING (ADL)
ADLS_ACUITY_SCORE: 20

## 2023-09-15 NOTE — PLAN OF CARE
Goal Outcome Evaluation:    End of Shift Summary  For vital signs and complete assessments, please see documentation flowsheets.     Pertinent assessments:   A&Ox4. Up independently, steady gait, denies dizziness. C/o mild abd pain this morning, refusing interventions, improved this afternoon. Scoring 1 on CIWA for mild nausea, one small emesis this morning. One BM this morning, per pt black in color.    Discharge instructions given to patient, questions answered. No new medications. Discharged home with mother.

## 2023-09-15 NOTE — CONSULTS
"CLINICAL NUTRITION SERVICES  -  ASSESSMENT NOTE      Recommendations:   - Diet per MD.  Discussed ok for food from home as needed, availability of small/frequent meals or snacks as needed.  - Discussed availability of prn Ensure and how to order via room service vs food focus.  - Continue daily MVI/M.  - Updated weight as able as suspect may be inaccurate.     MALNUTRITION:  % Weight Loss: Unable to determine, see below (not clear if admit wt accurate)  % Intake:  </= 50% for >/= 5 days (severe malnutrition)  Subcutaneous Fat Loss:  None observed  Muscle Loss:  None observed  Fluid Retention: None documented or noted    Malnutrition Diagnosis: Unable to determine due to insufficient information          REASON FOR ASSESSMENT  Karlo Fuentes is a 32 year old female seen by Registered Dietitian for Malnutrition Screening Tool (MST).     PMH of: Alcohol abuse, alcohol pancreatitis.    Admit 2/2: Concern for hepatorenal syndrome, abdominal pain, possible gastritis, etoh hepatitis, MILDRED.    NUTRITION HISTORY  - Information obtained from patient and chart.  - Diet at home: Regular when feeling well.  Meals 1-2x/day is baseline.  - Barriers to PO intakes: Notes she has only been able to have mostly water over the past week d/t N/V.  She was eating normally/at baseline prior to this time.  - Use of oral supplements: None.  - Allergies: NKFA.      CURRENT NUTRITION ORDERS  Diet Order:     Regular    Current Intake/Tolerance:  Limited timeframe of admit.  Had a small meal last evening.  Reports her family is bringing in food today and we discussed the option of prn Ensure until she is back to normal eating but that she can also get nutrition via a food focus instead.      ANTHROPOMETRICS  Height: 5' 4\"  Weight: 125 lbs 14.12 oz  Body mass index is 21.61 kg/m .  Weight Status:  Normal BMI  Weight History:  Wt Readings from Last 10 Encounters:   09/14/23 57.1 kg (125 lb 14.1 oz)     - No current documentation of edema or ascites " per imaging.  - No wt hx on file (care everywhere reviewed).    - Patient herself reports usual body weight of 135-138#.  She feels like she has had wt loss over the past week and has noticed changes in her appearance, but feels like admit wt is too low.  Notes she was weighed in the bed/not standing scale.  Question accuracy.    LABS: Reviewed including electrolytes.    MEDICATIONS: Reviewed.    GI: Stooling patterns noted.     SKIN: No current documentation of PI.       ASSESSED NUTRITION NEEDS PER APPROVED PRACTICE GUIDELINES:    Dosing Weight 57 kg   Estimated Energy Needs: 25-30 Kcal/Kg  Justification: maintenance  Estimated Protein Needs: 1-1.2 g pro/Kg  Justification: preservation of lean body mass  Estimated Fluid Needs: per MD      NUTRITION DIAGNOSIS:  Inadequate oral intake related to N/V, abdominal pain as evidenced by meeting <50% nutrition needs over the past week, concern for wt loss.    NUTRITION INTERVENTIONS  Recommendations / Nutrition Prescription  See above.    Implementation  Nutrition education: Provided education on above.    Medical Food Supplement: As above.     Nutrition goals:  Patient to consume at least 50-75% of meals or supplements >/=BID to show improvement in PO trending.    MONITORING AND EVALUATION:  Progress towards goals will be monitored and evaluated per protocol and Practice Guidelines          Valencia Clark RDN, LD  Clinical Dietitian  3rd floor/ICU: [phone]  All other floors: [phone]  Weekend/holiday: [phone]  Office: [phone]

## 2023-09-15 NOTE — PROGRESS NOTES
"GASTROENTEROLOGY PROGRESS NOTE     SUBJECTIVE:  Had one small emesis after taking a medication this morning. Abdominal pain improving. Reports black stool this morning - unwitnessed per nursing staff. No prior history of black or bloody stools prior to admission.      OBJECTIVE:  /69 (BP Location: Right arm)   Pulse 67   Temp 97.7  F (36.5  C) (Oral)   Resp 16   Ht 1.626 m (5' 4\")   Wt 57.1 kg (125 lb 14.1 oz)   LMP 2023   SpO2 100%   BMI 21.61 kg/m    Temp (24hrs), Av.7  F (36.5  C), Min:97.5  F (36.4  C), Max:98  F (36.7  C)    Patient Vitals for the past 72 hrs:   Weight   23 57.1 kg (125 lb 14.1 oz)   23 0424 61.2 kg (135 lb)       Intake/Output Summary (Last 24 hours) at 9/15/2023 1318  Last data filed at 2023 1713  Gross per 24 hour   Intake 600 ml   Output 150 ml   Net 450 ml        PHYSICAL EXAM  Gen: alert, oriented, NAD  Abd: soft, mild diffuse tenderness, no rebound/guarding or distension,+BS      Additional Comments:  ROS, FH, SH: See initial GI consult for details.     I have reviewed the patient's new clinical lab results:     Recent Labs   Lab Test 09/15/23  0549 23  0652 23  0442   WBC 3.3*  --  6.3   HGB 11.5*  --  16.8*   MCV 91  --  89     --  271   INR  --  0.93  --      Recent Labs   Lab Test 09/15/23  0549 23  2252 23  1700 23  0442   POTASSIUM 3.9  3.9 3.1* 3.3* 3.4   CHLORIDE 101  --  96* 81*   CO2 23  --  21* 13*   BUN 10.8  --  21.9* 31.9*   ANIONGAP 10  --  17* 38*     Recent Labs   Lab Test 09/15/23  0549 23  1712 23  0442   ALBUMIN 3.2*  --  5.6*   BILITOTAL 0.7  --  0.4   *  --  907*   *  --  1,421*   PROTEIN  --  70*  --    LIPASE  --   --  39        Assessment/Plan:  32 year old female with past medical history significant for alcohol abuse, reported history of pancreatitis in 2016 admitted  with abdominal pain and vomiting with workup notable for severely elevated " transaminases (, AST 1421) and MILDRED, and CT showing fatty liver, otherwise unremarkable.   1. Elevated liver enzymes. Likely a component of alcohol related hepatitis but typically does not cause such significant elevations in LFTs. No clear inciting medications. She had been dizzy without much PO intake prior to admission therefore transient liver ischemia possible. No significant Tylenol use. Imaging without signs of liver cirrhosis, ascites. INR noted to be normal indicating preserved liver function.     Tylenol level undetectable. Hepatitis A/B/C serologies pending. ALT/AST trending down. Total bili remains normal. Alk phos was mildly elevated and has normalized.     --Continue to trend LFTs.   --Avoid alcohol.   --Await hepatitis serologies.   --SEAN.     2. MILDRED. Suspected to be pre-renal. Creatinine 3.6 on admit. Normalized with IVFs.   --Monitor.    3. ?Melena. HGB likely hemoconcentrated on admission. Today it is 11.1. Reported black formed stool today. This was not witnessed by nursing staff. May be related to Pepto Bismol prior to admission.  --Monitor stool output to assess for GI bleed. Reviewed with nursing staff.  --Continue PPI BID. Can transition to PO.   --Continue carafate if helping abd pain.   --Monitor HGB.    4. Alcohol abuse. Alcohol use noted to be 750mL bottle of hard liquor daily up until 4 days prior to admission when not able to tolerate PO intake.     Will update Dr. Dwyer.     Time spent: 25 minutes, greater than 50% of the visit was spent in counseling/coordination of care.     Tonie Bose, PAC  [facility] ([facility])

## 2023-09-15 NOTE — DISCHARGE SUMMARY
[facility] Discharge Summary    Karlo Fuentes MRN# [id number]   Age: 32 year old YOB: 1990     Date of Admission:  9/14/2023  Date of Discharge::  9/15/2023  Admitting Physician:  Jason Chapin MD  Discharge Physician:  Jason Chapin MD     Home clinic: Not established          Admission Diagnoses:   Hepatorenal syndrome (H) [K76.7]  Alcohol abuse [F10.10]  Hepatorenal failure (H) [K76.7]  Hypertension, unspecified type [I10]          Discharge Diagnosis:     Principal Problem:    Acute renal failure (H)  Active Problems:    Alcohol abuse    Hypertension, unspecified type    Alcoholic ketosis (H)           Procedures:   CXR  CT Abd/pelvis          Medications Prior to Admission:     No medications prior to admission.             Discharge Medications:   There are no discharge medications for this patient.          Consultations:   Consultation during this admission received from gastroenterology          Hospital Course:   Karlo Fuentes is a 32 year old female admitted on 9/14/2023 with acute renal failure after she presented with abdominal pain and vomiting.     Initially, due to her admission of excessive and recurrent Etoh intake there was concern on the part of the ED physicians for HrS as a cause of her acute kidney dysfunction. However, she does not in fact have cirrhosis or liver failure, and she responded very well to fluid resuscitation.    I spoke with her at length about alcohol abuse, but I believe I was unsuccessful in getting her to recognize that this is a very dangerous long-term problem for her and that her alcohol consumption is not fully in her control.  We discussed her fatty liver disease and the association with liver injury and Etoh.    DX:  1.  Anion gap metabolic acidosis due to dehydration was starvation/alcoholic ketosis.  Correcting quickly.  2.  Acute hepatitis with fatty liver infiltration in the setting of known alcohol binge.  Appears highly likely  "that this is all due to alcohol intake despite the fact that the transaminases are higher than are typically seen with alcoholic hepatitis.  They have come down rapidly with fluid resuscitation.  Labs for viral hepatitides were collected under the direction of GI medicine consultant.  She was positive for immunity to both HBV and HAV.    3.  Severe hypertension, possibly long-standing, but this would be premature disease.  It is difficult to conclude whether the HTN is due to Etoh withdrawal or just Essential HTN.  She will need outpatient eval of this.  4.  Alcohol abuse, binge drinking in the past week.  The patient describes severe stress as a trigger for her drinking at this time.  She denies mood disorder.    BP (!) 140/85 (BP Location: Right arm)   Pulse 79   Temp 98.1  F (36.7  C) (Oral)   Resp 16   Ht 1.626 m (5' 4\")   Wt 57.1 kg (125 lb 14.1 oz)   LMP 09/14/2023   SpO2 100%   BMI 21.61 kg/m    On the date of discharge, Ms. Fuentes is alert, coherent and in NAD. Her affect is normal, and she does not appear to have any evidence of tremor or asterixis.  HEENT: no focal muscular asymmetry.  Chest: CTA  COR: RRR without murmur  Abd: Soft, NTND           Discharge Instructions and Follow-Up:     Discharge diet: Regular   Discharge activity: Activity as tolerated   Discharge follow-up: Establish care with a primary doctor as soon as you can.           Discharge Disposition:     Discharged to home      Attestation:  I have reviewed today's vital signs, notes, medications, labs and imaging.    Jason Chapin MD     "

## 2023-09-15 NOTE — PLAN OF CARE
Goal Outcome Evaluation:           Overall Patient Progress: improvingOverall Patient Progress: improving     Pertinent assessments: Pt arrived on the unit ~2025. A&O x4. Declined pain meds; stated it only hurt when she coughed. Up SBA to bathroom. PIV patent; completed the banana bag then started D5 1/2NS + KCl 20%. Recheck of K+ at 2245: 3.1; replaced and recheck in the morning. Mg also replaced and recheck in the morning.     Major Shift Events: arrived on the unit ~2025    Treatment Plan: IVF, pain management, monitor & replace electrolytes    Bedside Nurse: Alexandra Zavala RN

## 2023-09-18 LAB
HAV IGG SER QL IA: REACTIVE
HAV IGM SERPL QL IA: NONREACTIVE
HBV CORE AB SERPL QL IA: NONREACTIVE
HBV SURFACE AB SERPL IA-ACNC: 116.82 M[IU]/ML
HBV SURFACE AB SERPL IA-ACNC: REACTIVE M[IU]/ML
HCV AB SERPL QL IA: NONREACTIVE

## 2023-09-19 LAB — HBV SURFACE AG SERPL QL IA: REACTIVE

## 2023-10-18 ENCOUNTER — APPOINTMENT (OUTPATIENT)
Dept: ULTRASOUND IMAGING | Age: 33
End: 2023-10-18

## 2023-10-18 ENCOUNTER — APPOINTMENT (OUTPATIENT)
Dept: GENERAL RADIOLOGY | Age: 33
End: 2023-10-18

## 2023-10-18 ENCOUNTER — HOSPITAL ENCOUNTER (OUTPATIENT)
Age: 33
Setting detail: OBSERVATION
Discharge: HOME | End: 2023-10-20
Attending: STUDENT IN AN ORGANIZED HEALTH CARE EDUCATION/TRAINING PROGRAM | Admitting: INTERNAL MEDICINE

## 2023-10-18 DIAGNOSIS — R11.2 NAUSEA AND VOMITING, UNSPECIFIED VOMITING TYPE: ICD-10-CM

## 2023-10-18 DIAGNOSIS — N17.9 ACUTE KIDNEY INJURY (CMD): Primary | ICD-10-CM

## 2023-10-18 PROBLEM — F10.90 ALCOHOL USE DISORDER: Status: ACTIVE | Noted: 2023-10-18

## 2023-10-18 PROBLEM — E87.6 HYPOKALEMIA: Status: ACTIVE | Noted: 2023-10-18

## 2023-10-18 PROBLEM — E87.1 HYPONATREMIA: Status: ACTIVE | Noted: 2023-10-18

## 2023-10-18 PROBLEM — R79.89 ELEVATED LFTS: Status: ACTIVE | Noted: 2023-10-18

## 2023-10-18 LAB
ALBUMIN SERPL-MCNC: 4.4 G/DL (ref 3.6–5.1)
ALBUMIN/GLOB SERPL: 0.7 {RATIO} (ref 1–2.4)
ALP SERPL-CCNC: 117 UNITS/L (ref 45–117)
ALT SERPL-CCNC: 79 UNITS/L
ANION GAP SERPL CALC-SCNC: 16 MMOL/L (ref 7–19)
ANION GAP SERPL CALC-SCNC: 22 MMOL/L (ref 7–19)
AST SERPL-CCNC: 99 UNITS/L
ATRIAL RATE (BPM): 111
BASE DEFICIT BLDV-SCNC: -3 MMOL/L (ref -2–2)
BASOPHILS # BLD: 0 K/MCL (ref 0–0.3)
BASOPHILS NFR BLD: 1 %
BILIRUB SERPL-MCNC: 1.1 MG/DL (ref 0.2–1)
BUN SERPL-MCNC: 34 MG/DL (ref 6–20)
BUN/CREAT SERPL: 18 (ref 7–25)
CALCIUM SERPL-MCNC: 11 MG/DL (ref 8.4–10.2)
CHLORIDE SERPL-SCNC: 94 MMOL/L (ref 97–110)
CHLORIDE SERPL-SCNC: 99 MMOL/L (ref 97–110)
CO2 SERPL-SCNC: 15 MMOL/L (ref 21–32)
CO2 SERPL-SCNC: 23 MMOL/L (ref 21–32)
CREAT SERPL-MCNC: 1.9 MG/DL (ref 0.51–0.95)
DEPRECATED RDW RBC: 42.7 FL (ref 39–50)
EGFRCR SERPLBLD CKD-EPI 2021: 36 ML/MIN/{1.73_M2}
EOSINOPHIL # BLD: 0 K/MCL (ref 0–0.5)
EOSINOPHIL NFR BLD: 0 %
ERYTHROCYTE [DISTWIDTH] IN BLOOD: 13.1 % (ref 11–15)
ETHANOL SERPL-MCNC: NORMAL MG/DL
FASTING DURATION TIME PATIENT: ABNORMAL H
GLOBULIN SER-MCNC: 6 G/DL (ref 2–4)
GLUCOSE BLDC GLUCOMTR-MCNC: 74 MG/DL (ref 70–99)
GLUCOSE SERPL-MCNC: 196 MG/DL (ref 70–99)
HCG SERPL-ACNC: <2 MUNITS/ML
HCO3 BLDV-SCNC: 23 MMOL/L (ref 22–28)
HCT VFR BLD CALC: 45.1 % (ref 36–46.5)
HGB BLD-MCNC: 15.2 G/DL (ref 12–15.5)
HGB BLDA-MCNC: 13.8 G/DL (ref 12–15.5)
IMM GRANULOCYTES # BLD AUTO: 0 K/MCL (ref 0–0.2)
IMM GRANULOCYTES # BLD: 0 %
LACTATE BLDV-SCNC: 1.1 MMOL/L (ref 0–2)
LIPASE SERPL-CCNC: 37 UNITS/L (ref 15–77)
LYMPHOCYTES # BLD: 1.9 K/MCL (ref 1–4.8)
LYMPHOCYTES NFR BLD: 32 %
MAGNESIUM SERPL-MCNC: 2.5 MG/DL (ref 1.7–2.4)
MCH RBC QN AUTO: 30.3 PG (ref 26–34)
MCHC RBC AUTO-ENTMCNC: 33.7 G/DL (ref 32–36.5)
MCV RBC AUTO: 90 FL (ref 78–100)
MONOCYTES # BLD: 0.6 K/MCL (ref 0.3–0.9)
MONOCYTES NFR BLD: 11 %
NEUTROPHILS # BLD: 3.4 K/MCL (ref 1.8–7.7)
NEUTROPHILS NFR BLD: 56 %
NRBC BLD MANUAL-RTO: 0 /100 WBC
OXYHGB MFR BLDV: 54 % (ref 60–80)
P AXIS (DEGREES): 93
PCO2 BLDV: 44 MM HG (ref 38–51)
PH BLDV: 7.33 UNITS (ref 7.35–7.45)
PHOSPHATE SERPL-MCNC: 2.6 MG/DL (ref 2.4–4.7)
PLATELET # BLD AUTO: 360 K/MCL (ref 140–450)
PO2 BLDV: 33 MM HG (ref 35–42)
POTASSIUM SERPL-SCNC: 3.1 MMOL/L (ref 3.4–5.1)
POTASSIUM SERPL-SCNC: 3.5 MMOL/L (ref 3.4–5.1)
PR-INTERVAL (MSEC): 108
PROT SERPL-MCNC: 10.4 G/DL (ref 6.4–8.2)
QRS-INTERVAL (MSEC): 75
QT-INTERVAL (MSEC): 396
QTC: 536
R AXIS (DEGREES): 75
RBC # BLD: 5.01 MIL/MCL (ref 4–5.2)
REPORT TEXT: NORMAL
SAO2 % BLDV: 55 % (ref 60–80)
SODIUM SERPL-SCNC: 128 MMOL/L (ref 135–145)
SODIUM SERPL-SCNC: 134 MMOL/L (ref 135–145)
T AXIS (DEGREES): -31
TROPONIN I SERPL DL<=0.01 NG/ML-MCNC: 6 NG/L
VENTRICULAR RATE EKG/MIN (BPM): 110
WBC # BLD: 6 K/MCL (ref 4.2–11)

## 2023-10-18 PROCEDURE — 84100 ASSAY OF PHOSPHORUS: CPT

## 2023-10-18 PROCEDURE — 96375 TX/PRO/DX INJ NEW DRUG ADDON: CPT

## 2023-10-18 PROCEDURE — G0378 HOSPITAL OBSERVATION PER HR: HCPCS

## 2023-10-18 PROCEDURE — 84484 ASSAY OF TROPONIN QUANT: CPT | Performed by: EMERGENCY MEDICINE

## 2023-10-18 PROCEDURE — 96376 TX/PRO/DX INJ SAME DRUG ADON: CPT

## 2023-10-18 PROCEDURE — 83690 ASSAY OF LIPASE: CPT | Performed by: EMERGENCY MEDICINE

## 2023-10-18 PROCEDURE — 85025 COMPLETE CBC W/AUTO DIFF WBC: CPT | Performed by: EMERGENCY MEDICINE

## 2023-10-18 PROCEDURE — 96361 HYDRATE IV INFUSION ADD-ON: CPT

## 2023-10-18 PROCEDURE — 10002800 HB RX 250 W HCPCS: Performed by: INTERNAL MEDICINE

## 2023-10-18 PROCEDURE — 82803 BLOOD GASES ANY COMBINATION: CPT

## 2023-10-18 PROCEDURE — 99285 EMERGENCY DEPT VISIT HI MDM: CPT

## 2023-10-18 PROCEDURE — 82330 ASSAY OF CALCIUM: CPT | Performed by: INTERNAL MEDICINE

## 2023-10-18 PROCEDURE — 82077 ASSAY SPEC XCP UR&BREATH IA: CPT

## 2023-10-18 PROCEDURE — 82962 GLUCOSE BLOOD TEST: CPT

## 2023-10-18 PROCEDURE — 71045 X-RAY EXAM CHEST 1 VIEW: CPT

## 2023-10-18 PROCEDURE — 10004651 HB RX, NO CHARGE ITEM: Performed by: INTERNAL MEDICINE

## 2023-10-18 PROCEDURE — 84300 ASSAY OF URINE SODIUM: CPT | Performed by: INTERNAL MEDICINE

## 2023-10-18 PROCEDURE — 76705 ECHO EXAM OF ABDOMEN: CPT

## 2023-10-18 PROCEDURE — 96374 THER/PROPH/DIAG INJ IV PUSH: CPT

## 2023-10-18 PROCEDURE — 83605 ASSAY OF LACTIC ACID: CPT

## 2023-10-18 PROCEDURE — 82570 ASSAY OF URINE CREATININE: CPT | Performed by: INTERNAL MEDICINE

## 2023-10-18 PROCEDURE — 36415 COLL VENOUS BLD VENIPUNCTURE: CPT

## 2023-10-18 PROCEDURE — 83735 ASSAY OF MAGNESIUM: CPT

## 2023-10-18 PROCEDURE — 10002800 HB RX 250 W HCPCS

## 2023-10-18 PROCEDURE — 10002807 HB RX 258

## 2023-10-18 PROCEDURE — 10002803 HB RX 637: Performed by: INTERNAL MEDICINE

## 2023-10-18 PROCEDURE — 80051 ELECTROLYTE PANEL: CPT | Performed by: INTERNAL MEDICINE

## 2023-10-18 PROCEDURE — 10002807 HB RX 258: Performed by: INTERNAL MEDICINE

## 2023-10-18 PROCEDURE — 96372 THER/PROPH/DIAG INJ SC/IM: CPT | Performed by: INTERNAL MEDICINE

## 2023-10-18 PROCEDURE — 83036 HEMOGLOBIN GLYCOSYLATED A1C: CPT | Performed by: INTERNAL MEDICINE

## 2023-10-18 PROCEDURE — 99223 1ST HOSP IP/OBS HIGH 75: CPT | Performed by: INTERNAL MEDICINE

## 2023-10-18 PROCEDURE — 93005 ELECTROCARDIOGRAM TRACING: CPT | Performed by: EMERGENCY MEDICINE

## 2023-10-18 PROCEDURE — 81001 URINALYSIS AUTO W/SCOPE: CPT

## 2023-10-18 PROCEDURE — 10002801 HB RX 250 W/O HCPCS

## 2023-10-18 PROCEDURE — 84702 CHORIONIC GONADOTROPIN TEST: CPT

## 2023-10-18 PROCEDURE — 80053 COMPREHEN METABOLIC PANEL: CPT | Performed by: EMERGENCY MEDICINE

## 2023-10-18 PROCEDURE — 99284 EMERGENCY DEPT VISIT MOD MDM: CPT

## 2023-10-18 PROCEDURE — 93010 ELECTROCARDIOGRAM REPORT: CPT | Performed by: INTERNAL MEDICINE

## 2023-10-18 RX ORDER — 0.9 % SODIUM CHLORIDE 0.9 %
2 VIAL (ML) INJECTION EVERY 12 HOURS SCHEDULED
Status: DISCONTINUED | OUTPATIENT
Start: 2023-10-18 | End: 2023-10-20 | Stop reason: HOSPADM

## 2023-10-18 RX ORDER — POTASSIUM CHLORIDE 14.9 MG/ML
20 INJECTION INTRAVENOUS ONCE
Status: COMPLETED | OUTPATIENT
Start: 2023-10-18 | End: 2023-10-18

## 2023-10-18 RX ORDER — ACETAMINOPHEN 500 MG
500 TABLET ORAL EVERY 4 HOURS PRN
Status: ON HOLD | COMMUNITY
End: 2023-10-20 | Stop reason: HOSPADM

## 2023-10-18 RX ORDER — FAMOTIDINE 10 MG/ML
20 INJECTION, SOLUTION INTRAVENOUS ONCE
Status: COMPLETED | OUTPATIENT
Start: 2023-10-18 | End: 2023-10-18

## 2023-10-18 RX ORDER — GABAPENTIN 100 MG/1
100 CAPSULE ORAL EVERY 8 HOURS SCHEDULED
Status: DISCONTINUED | OUTPATIENT
Start: 2023-10-18 | End: 2023-10-19

## 2023-10-18 RX ORDER — ENOXAPARIN SODIUM 100 MG/ML
40 INJECTION SUBCUTANEOUS NIGHTLY
Status: DISCONTINUED | OUTPATIENT
Start: 2023-10-18 | End: 2023-10-20 | Stop reason: HOSPADM

## 2023-10-18 RX ORDER — ONDANSETRON 2 MG/ML
4 INJECTION INTRAMUSCULAR; INTRAVENOUS EVERY 6 HOURS PRN
Status: DISCONTINUED | OUTPATIENT
Start: 2023-10-18 | End: 2023-10-20 | Stop reason: HOSPADM

## 2023-10-18 RX ORDER — DEXTROSE MONOHYDRATE 25 G/50ML
12.5 INJECTION, SOLUTION INTRAVENOUS PRN
Status: DISCONTINUED | OUTPATIENT
Start: 2023-10-18 | End: 2023-10-20 | Stop reason: HOSPADM

## 2023-10-18 RX ORDER — LORAZEPAM 2 MG/ML
1 INJECTION INTRAMUSCULAR ONCE
Status: COMPLETED | OUTPATIENT
Start: 2023-10-18 | End: 2023-10-18

## 2023-10-18 RX ORDER — LORAZEPAM 2 MG/ML
2 INJECTION INTRAMUSCULAR
Status: DISCONTINUED | OUTPATIENT
Start: 2023-10-18 | End: 2023-10-19

## 2023-10-18 RX ORDER — LORAZEPAM 2 MG/1
4 TABLET ORAL
Status: DISCONTINUED | OUTPATIENT
Start: 2023-10-18 | End: 2023-10-19

## 2023-10-18 RX ORDER — LORAZEPAM 2 MG/ML
3 INJECTION INTRAMUSCULAR
Status: DISCONTINUED | OUTPATIENT
Start: 2023-10-18 | End: 2023-10-19

## 2023-10-18 RX ORDER — LORAZEPAM 2 MG/1
2 TABLET ORAL
Status: DISCONTINUED | OUTPATIENT
Start: 2023-10-18 | End: 2023-10-19

## 2023-10-18 RX ORDER — LORAZEPAM 2 MG/ML
4 INJECTION INTRAMUSCULAR
Status: DISCONTINUED | OUTPATIENT
Start: 2023-10-18 | End: 2023-10-19

## 2023-10-18 RX ORDER — LORAZEPAM 2 MG/ML
INJECTION INTRAMUSCULAR
Status: DISCONTINUED
Start: 2023-10-18 | End: 2023-10-18 | Stop reason: WASHOUT

## 2023-10-18 RX ORDER — GABAPENTIN 300 MG/1
300 CAPSULE ORAL EVERY 8 HOURS SCHEDULED
Status: DISCONTINUED | OUTPATIENT
Start: 2023-10-18 | End: 2023-10-18

## 2023-10-18 RX ORDER — NICOTINE POLACRILEX 4 MG
15 LOZENGE BUCCAL PRN
Status: DISCONTINUED | OUTPATIENT
Start: 2023-10-18 | End: 2023-10-20 | Stop reason: HOSPADM

## 2023-10-18 RX ORDER — DEXTROSE MONOHYDRATE 25 G/50ML
25 INJECTION, SOLUTION INTRAVENOUS PRN
Status: DISCONTINUED | OUTPATIENT
Start: 2023-10-18 | End: 2023-10-20 | Stop reason: HOSPADM

## 2023-10-18 RX ORDER — AMOXICILLIN 250 MG
2 CAPSULE ORAL DAILY PRN
Status: DISCONTINUED | OUTPATIENT
Start: 2023-10-18 | End: 2023-10-20 | Stop reason: HOSPADM

## 2023-10-18 RX ORDER — ACETAMINOPHEN 325 MG/1
650 TABLET ORAL EVERY 4 HOURS PRN
Status: DISCONTINUED | OUTPATIENT
Start: 2023-10-18 | End: 2023-10-20 | Stop reason: HOSPADM

## 2023-10-18 RX ORDER — SODIUM CHLORIDE, SODIUM LACTATE, POTASSIUM CHLORIDE, CALCIUM CHLORIDE 600; 310; 30; 20 MG/100ML; MG/100ML; MG/100ML; MG/100ML
INJECTION, SOLUTION INTRAVENOUS CONTINUOUS
Status: DISCONTINUED | OUTPATIENT
Start: 2023-10-18 | End: 2023-10-19

## 2023-10-18 RX ORDER — SODIUM CHLORIDE, SODIUM LACTATE, POTASSIUM CHLORIDE, CALCIUM CHLORIDE 600; 310; 30; 20 MG/100ML; MG/100ML; MG/100ML; MG/100ML
INJECTION, SOLUTION INTRAVENOUS CONTINUOUS
Status: DISCONTINUED | OUTPATIENT
Start: 2023-10-18 | End: 2023-10-18

## 2023-10-18 RX ORDER — NICOTINE POLACRILEX 4 MG
30 LOZENGE BUCCAL PRN
Status: DISCONTINUED | OUTPATIENT
Start: 2023-10-18 | End: 2023-10-20 | Stop reason: HOSPADM

## 2023-10-18 RX ORDER — POLYETHYLENE GLYCOL 3350 17 G/17G
17 POWDER, FOR SOLUTION ORAL DAILY PRN
Status: DISCONTINUED | OUTPATIENT
Start: 2023-10-18 | End: 2023-10-20 | Stop reason: HOSPADM

## 2023-10-18 RX ORDER — THIAMINE HYDROCHLORIDE 100 MG/ML
100 INJECTION, SOLUTION INTRAMUSCULAR; INTRAVENOUS ONCE
Status: COMPLETED | OUTPATIENT
Start: 2023-10-18 | End: 2023-10-18

## 2023-10-18 RX ORDER — FOLIC ACID 1 MG/1
1 TABLET ORAL DAILY
Status: DISCONTINUED | OUTPATIENT
Start: 2023-10-18 | End: 2023-10-20 | Stop reason: HOSPADM

## 2023-10-18 RX ORDER — ONDANSETRON 2 MG/ML
4 INJECTION INTRAMUSCULAR; INTRAVENOUS ONCE
Status: DISCONTINUED | OUTPATIENT
Start: 2023-10-18 | End: 2023-10-18

## 2023-10-18 RX ADMIN — SODIUM CHLORIDE, POTASSIUM CHLORIDE, SODIUM LACTATE AND CALCIUM CHLORIDE: 600; 310; 30; 20 INJECTION, SOLUTION INTRAVENOUS at 18:29

## 2023-10-18 RX ADMIN — SODIUM CHLORIDE, POTASSIUM CHLORIDE, SODIUM LACTATE AND CALCIUM CHLORIDE 1000 ML: 600; 310; 30; 20 INJECTION, SOLUTION INTRAVENOUS at 15:59

## 2023-10-18 RX ADMIN — SODIUM CHLORIDE, PRESERVATIVE FREE 2 ML: 5 INJECTION INTRAVENOUS at 21:16

## 2023-10-18 RX ADMIN — THIAMINE HYDROCHLORIDE 100 MG: 100 INJECTION, SOLUTION INTRAMUSCULAR; INTRAVENOUS at 18:15

## 2023-10-18 RX ADMIN — POTASSIUM CHLORIDE 20 MEQ: 14.9 INJECTION, SOLUTION INTRAVENOUS at 18:27

## 2023-10-18 RX ADMIN — GABAPENTIN 100 MG: 100 CAPSULE ORAL at 21:16

## 2023-10-18 RX ADMIN — FAMOTIDINE 20 MG: 10 INJECTION INTRAVENOUS at 13:58

## 2023-10-18 RX ADMIN — LORAZEPAM 1 MG: 2 INJECTION INTRAMUSCULAR; INTRAVENOUS at 13:58

## 2023-10-18 RX ADMIN — SODIUM CHLORIDE, POTASSIUM CHLORIDE, SODIUM LACTATE AND CALCIUM CHLORIDE 1000 ML: 600; 310; 30; 20 INJECTION, SOLUTION INTRAVENOUS at 13:58

## 2023-10-18 RX ADMIN — DIBASIC SODIUM PHOSPHATE, MONOBASIC POTASSIUM PHOSPHATE AND MONOBASIC SODIUM PHOSPHATE 250 MG: 852; 155; 130 TABLET ORAL at 21:15

## 2023-10-18 RX ADMIN — POTASSIUM CHLORIDE 20 MEQ: 14.9 INJECTION, SOLUTION INTRAVENOUS at 15:59

## 2023-10-18 RX ADMIN — FOLIC ACID 1 MG: 1 TABLET ORAL at 21:16

## 2023-10-18 RX ADMIN — ENOXAPARIN SODIUM 40 MG: 40 INJECTION SUBCUTANEOUS at 21:15

## 2023-10-18 RX ADMIN — MORPHINE SULFATE 4 MG: 4 INJECTION INTRAVENOUS at 13:58

## 2023-10-18 SDOH — ECONOMIC STABILITY: HOUSING INSECURITY: WHAT IS YOUR LIVING SITUATION TODAY?: APARTMENT

## 2023-10-18 SDOH — ECONOMIC STABILITY: HOUSING INSECURITY: WHAT IS YOUR LIVING SITUATION TODAY?: PARENT

## 2023-10-18 SDOH — ECONOMIC STABILITY: GENERAL

## 2023-10-18 SDOH — ECONOMIC STABILITY: HOUSING INSECURITY: ARE YOU WORRIED ABOUT LOSING YOUR HOUSING?: NO

## 2023-10-18 SDOH — SOCIAL STABILITY: SOCIAL NETWORK: SUPPORT SYSTEMS: FAMILY MEMBERS;FRIENDS

## 2023-10-18 SDOH — SOCIAL STABILITY: SOCIAL NETWORK
HOW OFTEN DO YOU SEE OR TALK TO PEOPLE THAT YOU CARE ABOUT AND FEEL CLOSE TO? (FOR EXAMPLE: TALKING TO FRIENDS ON THE PHONE, VISITING FRIENDS OR FAMILY, GOING TO CHURCH OR CLUB MEETINGS): 5 OR MORE TIMES A WEEK

## 2023-10-18 SDOH — HEALTH STABILITY: PHYSICAL HEALTH: DO YOU HAVE SERIOUS DIFFICULTY WALKING OR CLIMBING STAIRS?: NO

## 2023-10-18 SDOH — ECONOMIC STABILITY: TRANSPORTATION INSECURITY
IN THE PAST 12 MONTHS, HAS LACK OF TRANSPORTATION KEPT YOU FROM MEETINGS, WORK, OR FROM GETTING THINGS NEEDED FOR DAILY LIVING?: YES

## 2023-10-18 SDOH — ECONOMIC STABILITY: TRANSPORTATION INSECURITY
IN THE PAST 12 MONTHS, HAS THE LACK OF TRANSPORTATION KEPT YOU FROM MEDICAL APPOINTMENTS OR FROM GETTING MEDICATIONS?: NO

## 2023-10-18 SDOH — HEALTH STABILITY: PHYSICAL HEALTH: DO YOU HAVE DIFFICULTY DRESSING OR BATHING?: NO

## 2023-10-18 SDOH — ECONOMIC STABILITY: HOUSING INSECURITY: WHAT IS YOUR LIVING SITUATION TODAY?: CONDO

## 2023-10-18 SDOH — HEALTH STABILITY: GENERAL: BECAUSE OF A PHYSICAL, MENTAL, OR EMOTIONAL CONDITION, DO YOU HAVE DIFFICULTY DOING ERRANDS ALONE?: NO

## 2023-10-18 SDOH — HEALTH STABILITY: GENERAL
BECAUSE OF A PHYSICAL, MENTAL, OR EMOTIONAL CONDITION, DO YOU HAVE SERIOUS DIFFICULTY CONCENTRATING, REMEMBERING OR MAKING DECISIONS?: NO

## 2023-10-18 SDOH — ECONOMIC STABILITY: FOOD INSECURITY
HOW OFTEN IN THE PAST 12 MONTHS WERE YOU WORRIED OR STRESSED ABOUT HAVING ENOUGH MONEY TO BUY NUTRITIOUS MEALS?: SOMETIMES

## 2023-10-18 ASSESSMENT — ENCOUNTER SYMPTOMS
WOUND: 0
ABDOMINAL PAIN: 1
DIARRHEA: 0
NAUSEA: 1
SORE THROAT: 0
WEAKNESS: 0
NUMBNESS: 0
DIZZINESS: 1
COUGH: 0
BACK PAIN: 0
AGITATION: 0
HEADACHES: 0
CHILLS: 0
VOMITING: 1
FEVER: 0
SHORTNESS OF BREATH: 0

## 2023-10-18 ASSESSMENT — LIFESTYLE VARIABLES
HEADACHE, FULLNESS IN HEAD: NOT PRESENT
AGITATION: NORMAL ACTIVITY
HOW MANY STANDARD DRINKS CONTAINING ALCOHOL DO YOU HAVE ON A TYPICAL DAY: 5 OR 6
ALCOHOL_USE_STATUS: UNHEALTHY DRINKING IDENTIFIED. AUDIT C: 3 OR MORE FOR WOMEN AND 4 OR MORE FOR MEN.
VISUAL DISTURBANCES: NOT PRESENT
HEADACHE, FULLNESS IN HEAD: NOT PRESENT
HOW OFTEN DO YOU HAVE 6 OR MORE DRINKS ON ONE OCCASION: WEEKLY
ANXIETY: NO ANXIETY, AT EASE
PAROXYSMAL SWEATS: NO SWEAT VISIBLE
AUDITORY DISTURBANCES: NOT PRESENT
HOW OFTEN DO YOU HAVE A DRINK CONTAINING ALCOHOL: 2 TO 3 TIMES PER WEEK
AGITATION: NORMAL ACTIVITY
AUDIT-C TOTAL SCORE: 8
NAUSEA AND VOMITING: NO NAUSEA AND NO VOMITING
TREMOR: NO TREMOR
ANXIETY: NO ANXIETY, AT EASE
VISUAL DISTURBANCES: NOT PRESENT
NAUSEA AND VOMITING: NO NAUSEA AND NO VOMITING
AUDITORY DISTURBANCES: NOT PRESENT
TREMOR: NO TREMOR
PAROXYSMAL SWEATS: NO SWEAT VISIBLE

## 2023-10-18 ASSESSMENT — PATIENT HEALTH QUESTIONNAIRE - PHQ9
1. LITTLE INTEREST OR PLEASURE IN DOING THINGS: NOT AT ALL
SUM OF ALL RESPONSES TO PHQ9 QUESTIONS 1 AND 2: 0
SUM OF ALL RESPONSES TO PHQ9 QUESTIONS 1 AND 2: 0
2. FEELING DOWN, DEPRESSED OR HOPELESS: NOT AT ALL
CLINICAL INTERPRETATION OF PHQ2 SCORE: NO FURTHER SCREENING NEEDED
IS PATIENT ABLE TO COMPLETE PHQ2 OR PHQ9: YES

## 2023-10-18 ASSESSMENT — PAIN SCALES - GENERAL
PAINLEVEL_OUTOF10: 0
PAINLEVEL_OUTOF10: 0

## 2023-10-18 ASSESSMENT — ACTIVITIES OF DAILY LIVING (ADL)
RECENT_DECLINE_ADL: NO
ADL_SHORT_OF_BREATH: NO
ADL_BEFORE_ADMISSION: INDEPENDENT
ADL_SCORE: 12

## 2023-10-18 ASSESSMENT — COLUMBIA-SUICIDE SEVERITY RATING SCALE - C-SSRS
1. WITHIN THE PAST MONTH, HAVE YOU WISHED YOU WERE DEAD OR WISHED YOU COULD GO TO SLEEP AND NOT WAKE UP?: NO
2. HAVE YOU ACTUALLY HAD ANY THOUGHTS OF KILLING YOURSELF?: NO
IS THE PATIENT ABLE TO COMPLETE C-SSRS: YES
6. HAVE YOU EVER DONE ANYTHING, STARTED TO DO ANYTHING, OR PREPARED TO DO ANYTHING TO END YOUR LIFE?: NO

## 2023-10-19 LAB
ALBUMIN SERPL-MCNC: 2.8 G/DL (ref 3.6–5.1)
ALBUMIN/GLOB SERPL: 0.8 {RATIO} (ref 1–2.4)
ALP SERPL-CCNC: 71 UNITS/L (ref 45–117)
ALT SERPL-CCNC: 67 UNITS/L
ANION GAP SERPL CALC-SCNC: 12 MMOL/L (ref 7–19)
ANION GAP SERPL CALC-SCNC: 9 MMOL/L (ref 7–19)
APPEARANCE UR: CLEAR
AST SERPL-CCNC: 89 UNITS/L
BACTERIA #/AREA URNS HPF: ABNORMAL /HPF
BASOPHILS # BLD: 0 K/MCL (ref 0–0.3)
BASOPHILS NFR BLD: 1 %
BILIRUB SERPL-MCNC: 0.8 MG/DL (ref 0.2–1)
BILIRUB UR QL STRIP: POSITIVE
BUN SERPL-MCNC: 22 MG/DL (ref 6–20)
BUN/CREAT SERPL: 27 (ref 7–25)
CA-I ADJ PH7.4 SERPL-SCNC: 1.27 MMOL/L (ref 1.15–1.29)
CA-I SERPL ISE-SCNC: 1.31 MMOL/L (ref 1.15–1.29)
CALCIUM SERPL-MCNC: 8.9 MG/DL (ref 8.4–10.2)
CHLORIDE SERPL-SCNC: 100 MMOL/L (ref 97–110)
CHLORIDE SERPL-SCNC: 99 MMOL/L (ref 97–110)
CO2 SERPL-SCNC: 24 MMOL/L (ref 21–32)
CO2 SERPL-SCNC: 28 MMOL/L (ref 21–32)
COLOR UR: YELLOW
CREAT SERPL-MCNC: 0.82 MG/DL (ref 0.51–0.95)
CREAT UR-MCNC: 264 MG/DL
DEPRECATED RDW RBC: 43.4 FL (ref 39–50)
EGFRCR SERPLBLD CKD-EPI 2021: >90 ML/MIN/{1.73_M2}
EOSINOPHIL # BLD: 0 K/MCL (ref 0–0.5)
EOSINOPHIL NFR BLD: 1 %
ERYTHROCYTE [DISTWIDTH] IN BLOOD: 12.7 % (ref 11–15)
FASTING DURATION TIME PATIENT: ABNORMAL H
GLOBULIN SER-MCNC: 3.7 G/DL (ref 2–4)
GLUCOSE BLDC GLUCOMTR-MCNC: 123 MG/DL (ref 70–99)
GLUCOSE BLDC GLUCOMTR-MCNC: 143 MG/DL (ref 70–99)
GLUCOSE BLDC GLUCOMTR-MCNC: 83 MG/DL (ref 70–99)
GLUCOSE BLDC GLUCOMTR-MCNC: 89 MG/DL (ref 70–99)
GLUCOSE SERPL-MCNC: 77 MG/DL (ref 70–99)
GLUCOSE UR STRIP-MCNC: NEGATIVE MG/DL
HBA1C MFR BLD: 4.9 % (ref 4.5–5.6)
HCT VFR BLD CALC: 33.2 % (ref 36–46.5)
HGB BLD-MCNC: 11.4 G/DL (ref 12–15.5)
HGB UR QL STRIP: ABNORMAL
HYALINE CASTS #/AREA URNS LPF: ABNORMAL /LPF
IMM GRANULOCYTES # BLD AUTO: 0 K/MCL (ref 0–0.2)
IMM GRANULOCYTES # BLD: 0 %
KETONES UR STRIP-MCNC: >80 MG/DL
LEUKOCYTE ESTERASE UR QL STRIP: NEGATIVE
LYMPHOCYTES # BLD: 1.8 K/MCL (ref 1–4.8)
LYMPHOCYTES NFR BLD: 42 %
MAGNESIUM SERPL-MCNC: 1.7 MG/DL (ref 1.7–2.4)
MAGNESIUM SERPL-MCNC: 1.8 MG/DL (ref 1.7–2.4)
MCH RBC QN AUTO: 31.4 PG (ref 26–34)
MCHC RBC AUTO-ENTMCNC: 34.3 G/DL (ref 32–36.5)
MCV RBC AUTO: 91.5 FL (ref 78–100)
MONOCYTES # BLD: 0.5 K/MCL (ref 0.3–0.9)
MONOCYTES NFR BLD: 11 %
MUCOUS THREADS URNS QL MICRO: PRESENT
NEUTROPHILS # BLD: 2 K/MCL (ref 1.8–7.7)
NEUTROPHILS NFR BLD: 45 %
NITRITE UR QL STRIP: NEGATIVE
NRBC BLD MANUAL-RTO: 0 /100 WBC
PH UR STRIP: 6 [PH] (ref 5–7)
PHOSPHATE SERPL-MCNC: 2.5 MG/DL (ref 2.4–4.7)
PHOSPHATE SERPL-MCNC: 3 MG/DL (ref 2.4–4.7)
PLATELET # BLD AUTO: 201 K/MCL (ref 140–450)
POTASSIUM SERPL-SCNC: 3 MMOL/L (ref 3.4–5.1)
POTASSIUM SERPL-SCNC: 3.5 MMOL/L (ref 3.4–5.1)
PROT SERPL-MCNC: 6.5 G/DL (ref 6.4–8.2)
PROT UR STRIP-MCNC: 30 MG/DL
RBC # BLD: 3.63 MIL/MCL (ref 4–5.2)
RBC #/AREA URNS HPF: ABNORMAL /HPF
SODIUM SERPL-SCNC: 132 MMOL/L (ref 135–145)
SODIUM SERPL-SCNC: 133 MMOL/L (ref 135–145)
SODIUM UR-SCNC: 53 MMOL/L
SP GR UR STRIP: 1.03 (ref 1–1.03)
SQUAMOUS #/AREA URNS HPF: ABNORMAL /HPF
UROBILINOGEN UR STRIP-MCNC: 2 MG/DL
WBC # BLD: 4.3 K/MCL (ref 4.2–11)
WBC #/AREA URNS HPF: ABNORMAL /HPF

## 2023-10-19 PROCEDURE — 83735 ASSAY OF MAGNESIUM: CPT | Performed by: INTERNAL MEDICINE

## 2023-10-19 PROCEDURE — 82962 GLUCOSE BLOOD TEST: CPT

## 2023-10-19 PROCEDURE — 90792 PSYCH DIAG EVAL W/MED SRVCS: CPT

## 2023-10-19 PROCEDURE — 96366 THER/PROPH/DIAG IV INF ADDON: CPT

## 2023-10-19 PROCEDURE — 10002801 HB RX 250 W/O HCPCS: Performed by: INTERNAL MEDICINE

## 2023-10-19 PROCEDURE — 10002803 HB RX 637: Performed by: PSYCHIATRY & NEUROLOGY

## 2023-10-19 PROCEDURE — G0378 HOSPITAL OBSERVATION PER HR: HCPCS

## 2023-10-19 PROCEDURE — 10002800 HB RX 250 W HCPCS: Performed by: INTERNAL MEDICINE

## 2023-10-19 PROCEDURE — 10002807 HB RX 258: Performed by: INTERNAL MEDICINE

## 2023-10-19 PROCEDURE — 80053 COMPREHEN METABOLIC PANEL: CPT | Performed by: INTERNAL MEDICINE

## 2023-10-19 PROCEDURE — 96365 THER/PROPH/DIAG IV INF INIT: CPT

## 2023-10-19 PROCEDURE — 84100 ASSAY OF PHOSPHORUS: CPT | Performed by: INTERNAL MEDICINE

## 2023-10-19 PROCEDURE — 10002803 HB RX 637: Performed by: INTERNAL MEDICINE

## 2023-10-19 PROCEDURE — 99233 SBSQ HOSP IP/OBS HIGH 50: CPT | Performed by: INTERNAL MEDICINE

## 2023-10-19 PROCEDURE — 36415 COLL VENOUS BLD VENIPUNCTURE: CPT | Performed by: INTERNAL MEDICINE

## 2023-10-19 PROCEDURE — 80051 ELECTROLYTE PANEL: CPT | Performed by: INTERNAL MEDICINE

## 2023-10-19 PROCEDURE — 96372 THER/PROPH/DIAG INJ SC/IM: CPT | Performed by: INTERNAL MEDICINE

## 2023-10-19 PROCEDURE — 10004651 HB RX, NO CHARGE ITEM: Performed by: INTERNAL MEDICINE

## 2023-10-19 PROCEDURE — 85025 COMPLETE CBC W/AUTO DIFF WBC: CPT | Performed by: INTERNAL MEDICINE

## 2023-10-19 RX ORDER — MIRTAZAPINE 30 MG/1
30 TABLET, FILM COATED ORAL AT BEDTIME
Status: DISCONTINUED | OUTPATIENT
Start: 2023-10-19 | End: 2023-10-20 | Stop reason: HOSPADM

## 2023-10-19 RX ORDER — LANOLIN ALCOHOL/MO/W.PET/CERES
400 CREAM (GRAM) TOPICAL ONCE
Status: COMPLETED | OUTPATIENT
Start: 2023-10-20 | End: 2023-10-20

## 2023-10-19 RX ORDER — POTASSIUM CHLORIDE 20 MEQ/1
40 TABLET, EXTENDED RELEASE ORAL ONCE
Status: COMPLETED | OUTPATIENT
Start: 2023-10-19 | End: 2023-10-19

## 2023-10-19 RX ORDER — LANOLIN ALCOHOL/MO/W.PET/CERES
400 CREAM (GRAM) TOPICAL ONCE
Status: COMPLETED | OUTPATIENT
Start: 2023-10-19 | End: 2023-10-19

## 2023-10-19 RX ADMIN — Medication 400 MG: at 20:13

## 2023-10-19 RX ADMIN — POTASSIUM CHLORIDE 40 MEQ: 1500 TABLET, EXTENDED RELEASE ORAL at 13:06

## 2023-10-19 RX ADMIN — ENOXAPARIN SODIUM 40 MG: 40 INJECTION SUBCUTANEOUS at 20:13

## 2023-10-19 RX ADMIN — FOLIC ACID 1 MG: 1 TABLET ORAL at 08:49

## 2023-10-19 RX ADMIN — MIRTAZAPINE 30 MG: 30 TABLET, FILM COATED ORAL at 20:13

## 2023-10-19 RX ADMIN — GABAPENTIN 100 MG: 100 CAPSULE ORAL at 05:45

## 2023-10-19 RX ADMIN — POTASSIUM PHOSPHATE, MONOBASIC AND POTASSIUM PHOSPHATE, DIBASIC 20 MMOL: 224; 236 INJECTION, SOLUTION, CONCENTRATE INTRAVENOUS at 20:19

## 2023-10-19 RX ADMIN — THIAMINE HCL TAB 100 MG 100 MG: 100 TAB at 08:49

## 2023-10-19 RX ADMIN — SODIUM CHLORIDE, PRESERVATIVE FREE 2 ML: 5 INJECTION INTRAVENOUS at 20:14

## 2023-10-19 RX ADMIN — SODIUM CHLORIDE, PRESERVATIVE FREE 2 ML: 5 INJECTION INTRAVENOUS at 09:16

## 2023-10-19 RX ADMIN — SODIUM CHLORIDE, POTASSIUM CHLORIDE, SODIUM LACTATE AND CALCIUM CHLORIDE: 600; 310; 30; 20 INJECTION, SOLUTION INTRAVENOUS at 05:46

## 2023-10-19 ASSESSMENT — LIFESTYLE VARIABLES
AUDITORY DISTURBANCES: NOT PRESENT
VISUAL DISTURBANCES: NOT PRESENT
NAUSEA AND VOMITING: NO NAUSEA AND NO VOMITING
VISUAL DISTURBANCES: NOT PRESENT
HEADACHE, FULLNESS IN HEAD: NOT PRESENT
AUDITORY DISTURBANCES: NOT PRESENT
AGITATION: NORMAL ACTIVITY
HEADACHE, FULLNESS IN HEAD: NOT PRESENT
NAUSEA AND VOMITING: NO NAUSEA AND NO VOMITING
ANXIETY: NO ANXIETY, AT EASE
AUDITORY DISTURBANCES: NOT PRESENT
AGITATION: NORMAL ACTIVITY
PAROXYSMAL SWEATS: NO SWEAT VISIBLE
TREMOR: NO TREMOR
PAROXYSMAL SWEATS: NO SWEAT VISIBLE
ANXIETY: NO ANXIETY, AT EASE
ANXIETY: NO ANXIETY, AT EASE
PAROXYSMAL SWEATS: NO SWEAT VISIBLE
TREMOR: NO TREMOR
TREMOR: NO TREMOR
ANXIETY: NO ANXIETY, AT EASE
AUDITORY DISTURBANCES: NOT PRESENT
AGITATION: NORMAL ACTIVITY
HEADACHE, FULLNESS IN HEAD: NOT PRESENT
PAROXYSMAL SWEATS: NO SWEAT VISIBLE
VISUAL DISTURBANCES: NOT PRESENT
PAROXYSMAL SWEATS: NO SWEAT VISIBLE
VISUAL DISTURBANCES: NOT PRESENT
VISUAL DISTURBANCES: NOT PRESENT
AUDITORY DISTURBANCES: NOT PRESENT
ANXIETY: NO ANXIETY, AT EASE
VISUAL DISTURBANCES: NOT PRESENT
AUDITORY DISTURBANCES: NOT PRESENT
NAUSEA AND VOMITING: NO NAUSEA AND NO VOMITING
AUDITORY DISTURBANCES: NOT PRESENT
HEADACHE, FULLNESS IN HEAD: NOT PRESENT
TREMOR: NO TREMOR
NAUSEA AND VOMITING: NO NAUSEA AND NO VOMITING
HEADACHE, FULLNESS IN HEAD: NOT PRESENT
HEADACHE, FULLNESS IN HEAD: NOT PRESENT
TREMOR: NO TREMOR
TREMOR: NO TREMOR
PAROXYSMAL SWEATS: NO SWEAT VISIBLE
ANXIETY: NO ANXIETY, AT EASE
ANXIETY: NO ANXIETY, AT EASE
NAUSEA AND VOMITING: NO NAUSEA AND NO VOMITING
VISUAL DISTURBANCES: NOT PRESENT
NAUSEA AND VOMITING: NO NAUSEA AND NO VOMITING
NAUSEA AND VOMITING: NO NAUSEA AND NO VOMITING

## 2023-10-19 ASSESSMENT — PAIN SCALES - GENERAL
PAINLEVEL_OUTOF10: 0
PAINLEVEL_OUTOF10: 0

## 2023-10-20 VITALS
OXYGEN SATURATION: 100 % | RESPIRATION RATE: 16 BRPM | HEART RATE: 88 BPM | HEIGHT: 64 IN | WEIGHT: 121.25 LBS | DIASTOLIC BLOOD PRESSURE: 96 MMHG | SYSTOLIC BLOOD PRESSURE: 139 MMHG | TEMPERATURE: 97.5 F | BODY MASS INDEX: 20.7 KG/M2

## 2023-10-20 LAB
ALBUMIN SERPL-MCNC: 2.8 G/DL (ref 3.6–5.1)
ALBUMIN/GLOB SERPL: 0.8 {RATIO} (ref 1–2.4)
ALP SERPL-CCNC: 76 UNITS/L (ref 45–117)
ALT SERPL-CCNC: 73 UNITS/L
ANION GAP SERPL CALC-SCNC: 11 MMOL/L (ref 7–19)
AST SERPL-CCNC: 82 UNITS/L
BASOPHILS # BLD: 0 K/MCL (ref 0–0.3)
BASOPHILS NFR BLD: 1 %
BILIRUB SERPL-MCNC: 0.6 MG/DL (ref 0.2–1)
BUN SERPL-MCNC: 26 MG/DL (ref 6–20)
BUN/CREAT SERPL: 33 (ref 7–25)
CALCIUM SERPL-MCNC: 8.9 MG/DL (ref 8.4–10.2)
CHLORIDE SERPL-SCNC: 104 MMOL/L (ref 97–110)
CO2 SERPL-SCNC: 27 MMOL/L (ref 21–32)
CREAT SERPL-MCNC: 0.8 MG/DL (ref 0.51–0.95)
DEPRECATED RDW RBC: 41.8 FL (ref 39–50)
EGFRCR SERPLBLD CKD-EPI 2021: >90 ML/MIN/{1.73_M2}
EOSINOPHIL # BLD: 0 K/MCL (ref 0–0.5)
EOSINOPHIL NFR BLD: 1 %
ERYTHROCYTE [DISTWIDTH] IN BLOOD: 12.6 % (ref 11–15)
FASTING DURATION TIME PATIENT: ABNORMAL H
GLOBULIN SER-MCNC: 3.7 G/DL (ref 2–4)
GLUCOSE BLDC GLUCOMTR-MCNC: 92 MG/DL (ref 70–99)
GLUCOSE SERPL-MCNC: 100 MG/DL (ref 70–99)
HCT VFR BLD CALC: 31.7 % (ref 36–46.5)
HGB BLD-MCNC: 10.8 G/DL (ref 12–15.5)
IMM GRANULOCYTES # BLD AUTO: 0 K/MCL (ref 0–0.2)
IMM GRANULOCYTES # BLD: 0 %
LYMPHOCYTES # BLD: 1.7 K/MCL (ref 1–4.8)
LYMPHOCYTES NFR BLD: 47 %
MAGNESIUM SERPL-MCNC: 1.9 MG/DL (ref 1.7–2.4)
MAGNESIUM SERPL-MCNC: 2 MG/DL (ref 1.7–2.4)
MCH RBC QN AUTO: 31 PG (ref 26–34)
MCHC RBC AUTO-ENTMCNC: 34.1 G/DL (ref 32–36.5)
MCV RBC AUTO: 91.1 FL (ref 78–100)
MONOCYTES # BLD: 0.3 K/MCL (ref 0.3–0.9)
MONOCYTES NFR BLD: 10 %
NEUTROPHILS # BLD: 1.4 K/MCL (ref 1.8–7.7)
NEUTROPHILS NFR BLD: 41 %
NRBC BLD MANUAL-RTO: 0 /100 WBC
PHOSPHATE SERPL-MCNC: 3.8 MG/DL (ref 2.4–4.7)
PLATELET # BLD AUTO: 203 K/MCL (ref 140–450)
POTASSIUM SERPL-SCNC: 3.7 MMOL/L (ref 3.4–5.1)
PROT SERPL-MCNC: 6.5 G/DL (ref 6.4–8.2)
RBC # BLD: 3.48 MIL/MCL (ref 4–5.2)
SODIUM SERPL-SCNC: 138 MMOL/L (ref 135–145)
WBC # BLD: 3.5 K/MCL (ref 4.2–11)

## 2023-10-20 PROCEDURE — 82962 GLUCOSE BLOOD TEST: CPT

## 2023-10-20 PROCEDURE — 80053 COMPREHEN METABOLIC PANEL: CPT | Performed by: INTERNAL MEDICINE

## 2023-10-20 PROCEDURE — 99239 HOSP IP/OBS DSCHRG MGMT >30: CPT | Performed by: INTERNAL MEDICINE

## 2023-10-20 PROCEDURE — 99232 SBSQ HOSP IP/OBS MODERATE 35: CPT | Performed by: PSYCHIATRY & NEUROLOGY

## 2023-10-20 PROCEDURE — 84100 ASSAY OF PHOSPHORUS: CPT | Performed by: INTERNAL MEDICINE

## 2023-10-20 PROCEDURE — 10002803 HB RX 637: Performed by: INTERNAL MEDICINE

## 2023-10-20 PROCEDURE — 85025 COMPLETE CBC W/AUTO DIFF WBC: CPT | Performed by: INTERNAL MEDICINE

## 2023-10-20 PROCEDURE — 36415 COLL VENOUS BLD VENIPUNCTURE: CPT | Performed by: INTERNAL MEDICINE

## 2023-10-20 PROCEDURE — 83735 ASSAY OF MAGNESIUM: CPT | Performed by: INTERNAL MEDICINE

## 2023-10-20 PROCEDURE — 10004651 HB RX, NO CHARGE ITEM: Performed by: INTERNAL MEDICINE

## 2023-10-20 PROCEDURE — G0378 HOSPITAL OBSERVATION PER HR: HCPCS

## 2023-10-20 RX ORDER — MIRTAZAPINE 30 MG/1
30 TABLET, FILM COATED ORAL AT BEDTIME
Qty: 30 TABLET | Refills: 1 | Status: SHIPPED | OUTPATIENT
Start: 2023-10-20

## 2023-10-20 RX ORDER — LANOLIN ALCOHOL/MO/W.PET/CERES
100 CREAM (GRAM) TOPICAL DAILY
Qty: 30 TABLET | Refills: 1 | Status: SHIPPED | OUTPATIENT
Start: 2023-10-20

## 2023-10-20 RX ORDER — FOLIC ACID 1 MG/1
1 TABLET ORAL DAILY
Qty: 30 TABLET | Refills: 0 | Status: SHIPPED | OUTPATIENT
Start: 2023-10-20 | End: 2023-11-19

## 2023-10-20 RX ADMIN — SODIUM CHLORIDE, PRESERVATIVE FREE 2 ML: 5 INJECTION INTRAVENOUS at 08:08

## 2023-10-20 RX ADMIN — FOLIC ACID 1 MG: 1 TABLET ORAL at 08:08

## 2023-10-20 RX ADMIN — Medication 400 MG: at 04:51

## 2023-10-20 RX ADMIN — THIAMINE HCL TAB 100 MG 100 MG: 100 TAB at 08:07

## 2023-10-20 ASSESSMENT — LIFESTYLE VARIABLES
VISUAL DISTURBANCES: NOT PRESENT
NAUSEA AND VOMITING: NO NAUSEA AND NO VOMITING
AUDITORY DISTURBANCES: NOT PRESENT
TREMOR: NO TREMOR
TREMOR: NO TREMOR
ANXIETY: NO ANXIETY, AT EASE
PAROXYSMAL SWEATS: NO SWEAT VISIBLE
VISUAL DISTURBANCES: NOT PRESENT
PAROXYSMAL SWEATS: NO SWEAT VISIBLE
HEADACHE, FULLNESS IN HEAD: NOT PRESENT
NAUSEA AND VOMITING: NO NAUSEA AND NO VOMITING
PAROXYSMAL SWEATS: NO SWEAT VISIBLE
VISUAL DISTURBANCES: NOT PRESENT
AUDITORY DISTURBANCES: NOT PRESENT
HEADACHE, FULLNESS IN HEAD: NOT PRESENT
AGITATION: NORMAL ACTIVITY
ANXIETY: NO ANXIETY, AT EASE
ANXIETY: NO ANXIETY, AT EASE
AUDITORY DISTURBANCES: NOT PRESENT
VISUAL DISTURBANCES: NOT PRESENT
AGITATION: NORMAL ACTIVITY
AGITATION: NORMAL ACTIVITY
TREMOR: NO TREMOR
HEADACHE, FULLNESS IN HEAD: NOT PRESENT
NAUSEA AND VOMITING: NO NAUSEA AND NO VOMITING
HEADACHE, FULLNESS IN HEAD: NOT PRESENT
NAUSEA AND VOMITING: NO NAUSEA AND NO VOMITING
ANXIETY: NO ANXIETY, AT EASE
TREMOR: NO TREMOR
PAROXYSMAL SWEATS: NO SWEAT VISIBLE
AGITATION: NORMAL ACTIVITY
AUDITORY DISTURBANCES: NOT PRESENT

## 2023-10-20 ASSESSMENT — PAIN SCALES - GENERAL: PAINLEVEL_OUTOF10: 0

## 2023-12-23 ENCOUNTER — HOSPITAL ENCOUNTER (EMERGENCY)
Facility: CLINIC | Age: 33
Discharge: HOME | End: 2023-12-24
Attending: EMERGENCY MEDICINE | Admitting: EMERGENCY MEDICINE
Payer: MEDICAID

## 2023-12-23 DIAGNOSIS — F10.10 ALCOHOL ABUSE: ICD-10-CM
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
## 2023-12-23 PROCEDURE — 99285 EMERGENCY DEPT VISIT HI MDM: CPT

## 2023-12-23 PROCEDURE — 250N000011 HC RX IP 250 OP 636: Mod: JZ

## 2023-12-23 RX ORDER — HALOPERIDOL 5 MG/ML
10 INJECTION INTRAMUSCULAR ONCE
Status: COMPLETED | OUTPATIENT
Start: 2023-12-23 | End: 2023-12-23

## 2023-12-23 RX ORDER — HALOPERIDOL 5 MG/ML
INJECTION INTRAMUSCULAR
Status: COMPLETED
Start: 2023-12-23 | End: 2023-12-23

## 2023-12-23 RX ADMIN — MIDAZOLAM HYDROCHLORIDE 2 MG: 1 INJECTION, SOLUTION INTRAMUSCULAR; INTRAVENOUS at 22:27

## 2023-12-23 RX ADMIN — HALOPERIDOL 10 MG: 5 INJECTION INTRAMUSCULAR at 22:26

## 2023-12-23 RX ADMIN — HALOPERIDOL LACTATE 10 MG: 5 INJECTION, SOLUTION INTRAMUSCULAR at 22:26

## 2023-12-23 ASSESSMENT — ACTIVITIES OF DAILY LIVING (ADL): ADLS_ACUITY_SCORE: 35

## 2023-12-24 VITALS
HEART RATE: 90 BPM | TEMPERATURE: 97.6 F | RESPIRATION RATE: 16 BRPM | SYSTOLIC BLOOD PRESSURE: 125 MMHG | DIASTOLIC BLOOD PRESSURE: 77 MMHG | OXYGEN SATURATION: 97 %

## 2023-12-24 PROBLEM — F43.25 ADJUSTMENT DISORDER WITH MIXED DISTURBANCE OF EMOTIONS AND CONDUCT: Status: ACTIVE | Noted: 2023-12-24

## 2023-12-24 LAB
ALBUMIN SERPL BCG-MCNC: 4.2 G/DL (ref 3.5–5.2)
ALP SERPL-CCNC: 81 U/L (ref 40–150)
ALT SERPL W P-5'-P-CCNC: 21 U/L (ref 0–50)
AMPHETAMINES UR QL SCN: ABNORMAL
ANION GAP SERPL CALCULATED.3IONS-SCNC: 16 MMOL/L (ref 7–15)
APAP SERPL-MCNC: <5 UG/ML (ref 10–30)
AST SERPL W P-5'-P-CCNC: 31 U/L (ref 0–45)
BARBITURATES UR QL SCN: ABNORMAL
BASOPHILS # BLD AUTO: 0 10E3/UL (ref 0–0.2)
BASOPHILS NFR BLD AUTO: 1 %
BENZODIAZ UR QL SCN: ABNORMAL
BILIRUB SERPL-MCNC: <0.2 MG/DL
BUN SERPL-MCNC: 14.4 MG/DL (ref 6–20)
BZE UR QL SCN: ABNORMAL
CALCIUM SERPL-MCNC: 8.3 MG/DL (ref 8.6–10)
CANNABINOIDS UR QL SCN: ABNORMAL
CHLORIDE SERPL-SCNC: 103 MMOL/L (ref 98–107)
CREAT SERPL-MCNC: 0.77 MG/DL (ref 0.51–0.95)
DEPRECATED HCO3 PLAS-SCNC: 22 MMOL/L (ref 22–29)
EGFRCR SERPLBLD CKD-EPI 2021: >90 ML/MIN/1.73M2
EOSINOPHIL # BLD AUTO: 0 10E3/UL (ref 0–0.7)
EOSINOPHIL NFR BLD AUTO: 0 %
ERYTHROCYTE [DISTWIDTH] IN BLOOD BY AUTOMATED COUNT: 13.9 % (ref 10–15)
ETHANOL SERPL-MCNC: 0.28 G/DL
FENTANYL UR QL: ABNORMAL
GLUCOSE SERPL-MCNC: 72 MG/DL (ref 70–99)
HCG SERPL QL: NEGATIVE
HCT VFR BLD AUTO: 31.2 % (ref 35–47)
HGB BLD-MCNC: 10.5 G/DL (ref 11.7–15.7)
IMM GRANULOCYTES # BLD: 0 10E3/UL
IMM GRANULOCYTES NFR BLD: 0 %
LYMPHOCYTES # BLD AUTO: 2.4 10E3/UL (ref 0.8–5.3)
LYMPHOCYTES NFR BLD AUTO: 39 %
MCH RBC QN AUTO: 30.6 PG (ref 26.5–33)
MCHC RBC AUTO-ENTMCNC: 33.7 G/DL (ref 31.5–36.5)
MCV RBC AUTO: 91 FL (ref 78–100)
MONOCYTES # BLD AUTO: 0.3 10E3/UL (ref 0–1.3)
MONOCYTES NFR BLD AUTO: 5 %
NEUTROPHILS # BLD AUTO: 3.5 10E3/UL (ref 1.6–8.3)
NEUTROPHILS NFR BLD AUTO: 55 %
NRBC # BLD AUTO: 0 10E3/UL
NRBC BLD AUTO-RTO: 0 /100
OPIATES UR QL SCN: ABNORMAL
PCP QUAL URINE (ROCHE): ABNORMAL
PLATELET # BLD AUTO: 278 10E3/UL (ref 150–450)
POTASSIUM SERPL-SCNC: 3.7 MMOL/L (ref 3.4–5.3)
PROT SERPL-MCNC: 7.3 G/DL (ref 6.4–8.3)
RBC # BLD AUTO: 3.43 10E6/UL (ref 3.8–5.2)
SALICYLATES SERPL-MCNC: <0.3 MG/DL
SODIUM SERPL-SCNC: 141 MMOL/L (ref 135–145)
WBC # BLD AUTO: 6.3 10E3/UL (ref 4–11)

## 2023-12-24 PROCEDURE — 82077 ASSAY SPEC XCP UR&BREATH IA: CPT | Performed by: EMERGENCY MEDICINE

## 2023-12-24 PROCEDURE — 80179 DRUG ASSAY SALICYLATE: CPT | Performed by: EMERGENCY MEDICINE

## 2023-12-24 PROCEDURE — 80307 DRUG TEST PRSMV CHEM ANLYZR: CPT | Performed by: EMERGENCY MEDICINE

## 2023-12-24 PROCEDURE — 36415 COLL VENOUS BLD VENIPUNCTURE: CPT | Performed by: EMERGENCY MEDICINE

## 2023-12-24 PROCEDURE — 85025 COMPLETE CBC W/AUTO DIFF WBC: CPT | Performed by: EMERGENCY MEDICINE

## 2023-12-24 PROCEDURE — 84703 CHORIONIC GONADOTROPIN ASSAY: CPT | Performed by: EMERGENCY MEDICINE

## 2023-12-24 PROCEDURE — 80143 DRUG ASSAY ACETAMINOPHEN: CPT | Performed by: EMERGENCY MEDICINE

## 2023-12-24 PROCEDURE — 80053 COMPREHEN METABOLIC PANEL: CPT | Performed by: EMERGENCY MEDICINE

## 2023-12-24 ASSESSMENT — ACTIVITIES OF DAILY LIVING (ADL)
ADLS_ACUITY_SCORE: 35

## 2023-12-24 NOTE — ED PROVIDER NOTES
Patient changed from my care to follow-up with DEC recommendations on a 33-year-old female who presented with alcohol intoxication and reports of suicidal behavior.  Patient is now clinically sober.  She is no longer feeling suicidal.  She was evaluated DEC who is comfortable with outpatient management.  Family members were contacted by DEC and are comfortable with outpatient management.  Patient was offered transfer to detox but she is not interested.  She would like outpatient alcohol treatment resources.  Patient safe to discharge home with close follow-up with PCP and outpatient alcohol treatment resources     Jimmie London MD  12/24/23 0827

## 2023-12-24 NOTE — CONSULTS
"Diagnostic Evaluation Consultation  Crisis Assessment    Patient Name: Karlo Fuentes  Age:  33 year old  Legal Sex: female  Gender Identity: female  Pronouns:   Race: Black or   Ethnicity: Not  or   Language: English      Patient was assessed: Virtual: Cross Current Crisis Assessment Start Time: 0714 Crisis Assessment Stop Time: 0751  Patient location: [facility] EMERGENCY DEPT                             ED08    Referral Data and Chief Complaint  Karlo Fuentes presents to the ED via EMS (Arrived to the hospital in restraints because she did not want to come to the ED and was trying to leave.). Patient is presenting to the ED for the following concerns: Verbal agitation, Physical aggression, Suicidal ideation, Intoxication.   Factors that make the mental health crisis life threatening or complex are:  Karlo has been drinking alcohol at least 3-4 days a week and this has created conflict in the family.  Last night she threatened to jump off the balcony or use a knife to harm herself..      Informed Consent and Assessment Methods  Explained the crisis assessment process, including applicable information disclosures and limits to confidentiality, assessed understanding of the process, and obtained consent to proceed with the assessment.  Assessment methods included conducting a formal interview with patient, review of medical records, collaboration with medical staff, and obtaining relevant collateral information from family and community providers when available.  : done     Patient response to interventions: eager to participate, verbalizes understanding, acceptance expressed  Coping skills were attempted to reduce the crisis:        History of the Crisis   Karlo stated, \"I got into a fight with my family.  They were upset because I was drinking.  They kept telling me to kill myself.  I told them I am going to jump off of the balcony.\"  She shared that her family called the " "police.  She stated, \"they wouldn't let me leave.\"  She was transported to the hospital.  Glen stated, \"I am not suicidal, I just did that because of what they were saying.  I am not suicidal, I love my family and my life.\"  Karlo reported that she received a DUI in February of 2023 and needs to participate in treatment.  Finances have been a barrier.  She was open to having a referral placed for a substance use assessment.    Brief Psychosocial History  Family:  Single, Children no  Support System:  Parent(s), Sibling(s)  Employment Status:  unemployed (Currently taking care of her 21 year old sister who was in a significant car accident in September and needs care due to a TBI and other injuries)  Source of Income:  none  Financial Environmental Concerns:   (Moved back in with her parents in August.  Quit working when her sister had her accident)  Current Hobbies:  television/movies/videos  Barriers in Personal Life:  other (see comments)    Significant Clinical History  Current Anxiety Symptoms:   (Karlo denied any symptoms)  Current Depression/Trauma:     Current Somatic Symptoms:     Current Psychosis/Thought Disturbance:  impulsive  Current Eating Symptoms:     Chemical Use History:  Alcohol: Binge  Last Use:: 12/24/23 (Drinks 3-4 times a week, mostly because I am bored)  Benzodiazepines: None  Opiates: None  Cocaine: None  Marijuana: Occasional  Last Use:: 12/22/23  Other Use: None   Past diagnosis:  Substance Use Disorder  Family history:   (Patient reported that she was unaware of any mental health diagnosis of family members)  Past treatment:     Details of most recent treatment:  Karlo reported that she has been seen in the ED before related to her alcohol use.  She has not participated in any other services currently or in the past.  Other relevant history:          Collateral Information  Is there collateral information: Yes     Collateral information name, relationship, phone number:  [place] [facility] [phone]    What happened today: She was drinking to much.  Threatened to kill herself with a knife or jump out of the window.  Fighting all the time.     What is different about patient's functioning: Karlo stated my Mom and Dad don't love me.  A lot of fighting.     Concern about alcohol/drug use:      What do you think the patient needs:      Has patient made comments about wanting to kill themselves/others: yes    If d/c is recommended, can they take part in safety/aftercare planning:  yes    Additional collateral information:  Family would like her to get into substance use treatment.  They feel like she needs help with drinking.     Risk Assessment  Robeson Suicide Severity Rating Scale Full Clinical Version:  Suicidal Ideation  Q1 Wish to be Dead (Lifetime): Yes  Q2 Non-Specific Active Suicidal Thoughts (Lifetime): Yes  3. Active Suicidal Ideation with any Methods (Not Plan) Without Intent to Act (Lifetime): Yes  Q4 Active Suicidal Ideation with Some Intent to Act, Without Specific Plan (Lifetime): Yes  Q5 Active Suicidal Ideation with Specific Plan and Intent (Lifetime): Yes  Q6 Suicide Behavior (Lifetime): yes     Suicidal Behavior (Lifetime)  Actual Attempt (Lifetime): No  Aborted or Self-Interrupted Attempt (Lifetime): Yes  Total Number of Aborted or Self-Interrupted Attempts (Lifetime): 1  Aborted or Self-Interrupted Attempt Description (Lifetime): Karlo was threatening to jump off the balcony and they intervened. (Reported that behavior was impulsive.)  Preparatory Acts or Behavior (Lifetime): No    Robeson Suicide Severity Rating Scale Recent:   Suicidal Ideation (Recent)  Q1 Wished to be Dead (Past Month): yes  Q2 Suicidal Thoughts (Past Month): yes  Q3 Suicidal Thought Method: yes  Q4 Suicidal Intent without Specific Plan: yes  Q5 Suicide Intent with Specific Plan: yes  Level of Risk per Screen: high risk  Intensity of Ideation (Recent)  Most Severe Ideation Rating (Past 1 Month):  5  Description of Most Severe Ideation (Past 1 Month): Karlo shared that she was responding to what her family was telling her to do.  She shared that it was impulsive and she had no intention of going through with it.  She denied current suicidal or homicidal thoughts.  Frequency (Past 1 Month): Less than once a week  Duration (Past 1 Month): 1-4 hours/a lot of time  Controllability (Past 1 Month): Can control thoughts with some difficulty  Deterrents (Past 1 Month): Deterrents probably stopped you  Reasons for Ideation (Past 1 Month): Completely to get attention, revenge, or a reaction from others  Suicidal Behavior (Recent)  Actual Attempt (Past 3 Months): No  Has subject engaged in non-suicidal self-injurious behavior? (Past 3 Months): No  Interrupted Attempts (Past 3 Months): Yes  Total Number of Interrupted Attempts (Past 3 Months): 1  Interrupted Attempt Description (Past 3 Months): Reported that family would not allow her to go on the balcony.  Preparatory Acts or Behavior (Past 3 Months): No    Environmental or Psychosocial Events: legal issues such as DWI, DUI, lawsuit, CPS involvement, etc.  Protective Factors: Protective Factors: strong bond to family unit, community support, or employment, responsibilities and duties to others, including pets and children (I love my life, I love my family)    Does the patient have thoughts of harming others? Feels Like Hurting Others: no  Previous Attempt to Hurt Others: no  Is the patient engaging in sexually inappropriate behavior?: no    Is the patient engaging in sexually inappropriate behavior?  no        Mental Status Exam   Affect: Appropriate  Appearance: Appropriate  Attention Span/Concentration: Attentive  Eye Contact: Engaged    Fund of Knowledge: Appropriate   Language /Speech Content: Fluent  Language /Speech Volume: Normal  Language /Speech Rate/Productions: Articulate  Recent Memory: Intact  Remote Memory: Intact  Mood: Normal  Orientation to Person: Yes    Orientation to Place: Yes  Orientation to Time of Day: Yes  Orientation to Date: Yes     Situation (Do they understand why they are here?): Yes  Psychomotor Behavior: Normal  Thought Content: Clear  Thought Form:       Mini-Cog Assessment  Number of Words Recalled:    Clock-Drawing Test:     Three Item Recall:    Mini-Cog Total Score:       Medication  Psychotropic medications:   Medication Orders - Psychiatric (From admission, onward)      None             Current Care Team  Patient Care Team:  No Ref-Primary, Physician as PCP - General    Diagnosis  Patient Active Problem List   Diagnosis Code    Alcohol abuse F10.10    Acute renal failure (H24) N17.9    Hypertension, unspecified type I10    Alcoholic ketosis (H) E88.89    Fatty liver K76.0    Adjustment disorder with mixed disturbance of emotions and conduct F43.25     Primary Problem This Admission  Active Hospital Problems    F43.25  Adjustment disorder with mixed disturbance of emotions and conduct    F10,10 Alcohol abuse    Clinical Summary and Substantiation of Recommendations   After therapeutic assessment, intervention and aftercare planning by ED care team and LMHP and in consultation with attending provider, the patient's circumstances and mental state were appropriate for outpatient management. It is the recommendation of this clinician that pt discharge with OP MH support. A this time the pt is not presenting as an acute risk to self or others due to the following factors: Karlo is denying suicidal or homicidal ideations now that she is sober.  She is interested in having a referral placed for substance use treatment.   Reviewed safety resources with Karlo and her family on the phone during collateral contact.    Patient coping skills attempted to reduce the crisis:       Disposition  Recommended disposition: Substance Abuse Disorder Treatment, Detox, Rule 25/KELLI Assessment        Reviewed case and recommendations with attending provider. Attending  Name: Dr. London, He was going to offer Detox to patient.  Substance Use resources provided.       Attending concurs with disposition: yes       Patient and/or validated legal guardian concurs with disposition:           Final disposition:  discharge    Legal status on admission:      Assessment Details   Total duration spent with the patient: 38 min     CPT code(s) utilized: 93507 - Psychotherapy for Crisis - 60 (30-74*) min    GUSTABO CHAMORRO, Psychotherapist  [facility] - Triage & Transition Services  Callback: [phone]

## 2023-12-24 NOTE — DISCHARGE INSTRUCTIONS
Please make an appointment to follow up with your primary care provider in 5-7 days even if entirely better.      Substance Use Disorder Direct Access Resources    It is recommended that you abstain from all mood altering chemicals. Please contact the sober support hotline ([phone]) as needed; phones are answered 24 hours a day, 7 days a week.    To access substance use treatment you must have a comprehensive assessment completed to begin any treatment program.     If uninsured, please contact your county of residence for eligibility screen to substance use disorder evaluation and treatment:    [place] - [phone]   [place]  [phone]   [facility] [phone]   [place]  [phone]   [facility] [phone]   [facility] [phone]   [facility] [phone]   [place] - [phone]     If you have private insurance, call the customer service number on the back of your insurance card to find an in-network substance abuse use disorder assessment. The ideal provider will be a treatment facility, licensed in the [facility].     Community KELLI Evaluations: Clients may call their county for a full list of providers - Availability and services listed belo are subject to change, please call the provider to confirm    [facility]  [phone]  [address] [place] [address], [address]  *Please call the above number to schedule a comprehensive assessment for determination of level of care needs. In person and virtual appointments available Mon-Fri.    [facility], [address], First Floor, [address], [place] [address] (next to the outpatient lab)    Phone: [phone]   Provides bridging services to people with Opiate Use Disorders (OUD) seeking care. This is a front door to Medication Assisted Treatments (MAT), ages 16+  Walk In hours: Monday-Friday 9:00am-3:00pm    [facility]  [phone]  Walk in Assessments: Mon-Friday 7a-1:45n [address] [facility], [address]    [facility]  Central Access [phone]  [address] [place], [place], [address]  *by appointment only    [facility]  [phone] (phone consultation available )  Locations in: [place], [place], [facility], and [place], [place]  [place] virtual IOP [phone] or visit [email or web address]   Also offers LGBTQ programming     [facility]  [phone]  [address] [facility]  [place], [place], [address]  *Currently only offered via telehealth - call to set up an appointment    [facility]  [address] [place] [address], [address]  [facility]  For more information to to make a referral call:  [phone]  Walk-in on   9-11 a.m.    [facility]  [phone]  [address] [place], [place], [address]  *available by appointments only    [facility] specific  [phone]  [address] [place], [place], [address]  *available by appointment only    [facility]  [phone]  [address] [place], [place], [address]  *walk in assessments available M-F starting at 7 am.    [facility]  [phone]  Locations: [facility], [facility], and [place]  *Walk in assessments availble M-F starting at 8 am -virtual only    [facility]  [phone]  [address] [place] [address]    [facility]  Virtual + Locations: [place], [place], [place], [place], [facility], [place], [place], [place]   [phone]  *available by appointment only    [facility]  [phone]  [address]  [place], [address]    [facility]  [phone]  [address]  [place], [place], [address]  *available by appointment    [facility]  [phone]  [address] [place], [place], [address]  *walk ins available M- from 9-3    [place]  [facility]  MAT program: [phone]  [address]  [place], [place], [address]    [facility]  [phone]  Same day substance use disorder assessments are available Monday - Friday, via walk-in or by appointment at the [place] location.  [address], [address], [place] [address]     [facility] - adolescent and adult SUDs services  [phone]  Offer services Monday through Friday, as well as evening hours Monday through Thursday. Normally, a first appointment will be scheduled within one week  [email or web address]  Locations all over [place]    If you are intoxicated, you may be required to detox at a detox facility before starting treatment. The following are detox facilities that you can self present to. All detox facilities are able to help you complete an assessment prior to discharge if you choose:    [facility]: Arrive at a [facility] for immediate medical evaluation    [facility]: [address] [place], [place], [address].         [phone]    [facility]: [address] [place], [place], [address]  [phone]     [facility]): [address] [place], [place], [address] [phone]     [facility]: [address] [place], [place], [address], [phone]         Ways to help cope with sobriety:    -- Take prescribed medicines as scheduled  -- Keep follow-up appointments  -- Talk to others about your concerns  -- Get regular exercise  -- Practice deep breathing skills  -- Eat a healthy diet  -- Use community resources, including hotline numbers, [facility] crisis and support meetings  -- Stay sober and avoid places/people/things associated with substance use  --Maintain a daily schedule/routine  --Get at least 7-8 hours of sleep per night  --Create a list 10--20 healthy activities that you can do that are enjoyable and do not involve substance use  --Create daily  goals (approx. 1-4 goals) per day and work to achieve them throughout the day.       Free Resources:    [facility] ([facility])  [facility] connects people seeking recovery to resources that help foster and sustain long-term recovery. Whether you are seeking resources for treatment, transportation, housing, job training, education, health care or other pathways to recovery, [facility] is a great place to start.  Phone: [phone]. www.[place]BrandBacker.Sharecare (Great listing of all types of recovery and non-recovery related resources)    Alcoholics Anonymous  Phone: 0-433-ALCOHOL  Website: HTTP://WWW.AA.ORG/  [facility] [place] (926-930-0510 or http://aaMuecs.org)  [facility] [place] (982-614-7474 or www.aastpaul.org)     Narcotics Anonymous  Phone: 483.342.4799  Website: www.[employer].BoxFox.    [facility] [address], [place], [place],  Phone: [phone]  Drop-in Hours: Monday-Friday 9-11:30 am. By appointment at other times.  Provides: [facility] is a drop-in center on the [place] side of [place] that provides a safe space for individuals who are homeless and have a history of chemical use. Sobriety is not a requirement but drugs and alcohol are not allowed on the property.  Services: Non-clients can access drop-in services such as Recovery and Harm Reduction Groups, referrals to case management, community activities, shower facilities, and a pool table. Individuals who are homeless and have chemical health needs may be eligible for enrollment into [facility]'s case management program. Clients and  work together to access benefits, treatment, health care, shelter, and external housing resources.

## 2023-12-24 NOTE — ED NOTES
RN ED Mental Health Handoff Note    LAZARA    Does patient require 1:1? No    Hold and rights been given and documented for patient: unknown, pt is currently sleeping, will reassess when awake    Is the patient in BH scrubs? No -in street clothes    Has the patient been searched? Unknown, pt is in street clothes and is currently sleeping    Is the 15 minute observation tool up to date? Yes    Was patient issued a welcome folder? No -will reassess when awake    Room check completed this shift: Yes    PSS3 and [place] Assessment/Reassessment this shift:    PSS-3      Date and Time Over the past 2 weeks have you felt down, depressed, or hopeless? Over the past 2 weeks have you had thoughts of killing yourself? Have you ever attempted to kill yourself? When did this last happen? User   12/23/23 2356 yes yes yes -- BO   12/23/23 2236 refused refused refused -- VCB          C-SSRS ([place])      Date and Time Q1 Wished to be Dead (Past Month) Q2 Suicidal Thoughts (Past Month) Q3 Suicidal Thought Method Q4 Suicidal Intent without Specific Plan Q5 Suicide Intent with Specific Plan Q6 Suicide Behavior (Lifetime) Within the Past 3 Months? RETIRED: Level of Risk per Screen Screening Not Complete User   12/23/23 2356 yes yes yes no yes -- -- -- -- BO            Behavioral status of patient: Yellow. Pt arrived and was placed in 5-point restraints and was given IM versed and haldol, at this time pt is calm and cooperative and is sleeping in her room    Code 21 called this shift? No-however, pt was placed in 5-point restraints on arrival and was given IM sedatives to help with behaviors    Use of restraints/seclusion this shift? Yes. Details: see flow sheet, was in 5-points but these are no longer necessary    Most recent vital signs:  Temp: 98.6  F (37  C) Temp src: Oral BP: 120/77 Pulse: 100   Resp: 16 SpO2: 96 %        Medications:  Scheduled medication compliance? No scheduled meds    PRN Meds administered this shift? Yes,  haldol and versed    Medications   haloperidol lactate (HALDOL) injection 10 mg (10 mg Intramuscular $Given 12/23/23 2226)   midazolam (VERSED) injection 2 mg (2 mg Intramuscular $Given 12/23/23 2227)   midazolam (VERSED) injection 2.5 mg (2.5 mg Intramuscular Not Given 12/24/23 0027)         ADLs    Meal Provided this shift? Yes box lunch provided    Hygiene items provided? No-refused, will reassess when awake    ADLs completed? Yes-pt went to the bathroom and then went to bed    Date of last shower: unknown    Any significant events this shift? Yes. Details: combative on arrival, now much more calm and cooperative    Any information that would be helpful in caring for this patient?  Pt appears to be much more cooperative at this time.    Family present/updated? No    Location of patient's belongings: Writer was not here when pt arrived, assuming in DEC office     Critical Care Minutes:  Does the patient need critical care minutes documented? No-pt has been calm and cooperative since coming to room 8

## 2023-12-24 NOTE — ED NOTES
Brought pt personal belongings bag, and wondering about transportation home. Did explain to patient the hospital does not offer transportation home. Provider ok with pt being discharged to lobby.

## 2023-12-24 NOTE — ED NOTES
Patient reevaluated after 8+ hours of observation in the emergency department.  She has no metabolized her alcohol.  We will order DEC assessment given patient's significant suicidal gestures last evening.  She is cooperative showing no signs of psychosis at this time.     Nikunj Stephens MD  12/24/23 9909

## 2023-12-24 NOTE — ED NOTES
RN ED Mental Health Handoff Note    LAZARA    Does patient require 1:1? No    Hold and rights been given and documented for patient: Pt has been sleeping since arrival.     Is the patient in BH scrubs? No -street clothes     Has the patient been searched? Yes - one bag in DEC per security     Is the 15 minute observation tool up to date? Yes    Was patient issued a welcome folder? No will assess when awake     Room check completed this shift: Yes    PSS3 and [place] Assessment/Reassessment this shift:    PSS-3      Date and Time Over the past 2 weeks have you felt down, depressed, or hopeless? Over the past 2 weeks have you had thoughts of killing yourself? Have you ever attempted to kill yourself? When did this last happen? User   12/23/23 2356 yes yes yes -- BO   12/23/23 2236 refused refused refused -- VCB          C-SSRS ([place])      Date and Time Q1 Wished to be Dead (Past Month) Q2 Suicidal Thoughts (Past Month) Q3 Suicidal Thought Method Q4 Suicidal Intent without Specific Plan Q5 Suicide Intent with Specific Plan Q6 Suicide Behavior (Lifetime) Within the Past 3 Months? RETIRED: Level of Risk per Screen Screening Not Complete User   12/23/23 2356 yes yes yes no yes -- -- -- -- BO            Behavioral status of patient: Green- has been sleeping after care assume     Code 21 called this shift? No    Use of restraints/seclusion this shift? Yes. Details: arrived with 5 points    Most recent vital signs:  Temp: 98.6  F (37  C) Temp src: Oral BP: 120/77 Pulse: 100   Resp: 16 SpO2: 96 %        Medications:  Scheduled medication compliance? No    PRN Meds administered this shift? No    Medications   haloperidol lactate (HALDOL) injection 10 mg (10 mg Intramuscular $Given 12/23/23 2226)   midazolam (VERSED) injection 2 mg (2 mg Intramuscular $Given 12/23/23 2227)   midazolam (VERSED) injection 2.5 mg (2.5 mg Intramuscular Not Given 12/24/23 0027)         ADLs    Meal Provided this shift? No    Hygiene items  provided? No    ADLs completed? No    Date of last shower: PTA    Any significant events this shift? Yes. combative on arrival, now much more calm and cooperative     Any information that would be helpful in caring for this patient?  Need DEC when awake     Family present/updated? No    Location of patient's belongings: 1 Bag in DEC office per security     Critical Care Minutes:  Does the patient need critical care minutes documented? No

## 2023-12-24 NOTE — ED NOTES
"Patient actively fighting against restraints. Screaming curse words at staff, specifically stating \"You bitches\" \"You red-headed bitch\", \"bet\". Not following commands, not following directions.   Restraints continued due to ongoing behaviors. Attempted to explain criteria for restraint removal, to which patient that's \"You red-headed bitch\".   IM medications with verbal from MD young, given IM versed and Haldolol   "

## 2023-12-24 NOTE — ED TRIAGE NOTES
Arrives from home via EMS on a hold due to being intoxicated and reportedly attempted to commit suicide by jumping off a 3rd floor balcony. States family pulled her back in and then she attempted to harm herself with a kitchen knife.     Arrived in restraints. Combative and not following commands.

## 2023-12-24 NOTE — ED NOTES
Violent restraints discontinued. Patient up to bathroom with assistance. Water given and warm blankets. Patient appropriate.

## 2023-12-24 NOTE — ED PROVIDER NOTES
History     Chief Complaint:  Suicidal and Aggressive Behavior       HPI   Karlo Fuentes is a 33 year old female who presents in the ED today due to suicidal and aggressive behavior. The patient was agitated. She disclosed that she drank a bottle of [place] Vodka and is currently intoxicated.  Due to the patient's intoxication and the absence of a witness, the history is limited. The patient denies suicidal ideation and bodily injuries.  The patient reportedly tried to jump off of a third story balcony this evening and also tried to harm herself with a knife.    Independent Historian:    None- The Patient only     Review of External Notes:  Discharge summary from 10/20/2023 reviewed with the patient was admitted for vomiting.  She was noted to express depressive symptoms but denied suicidality at that time.  She was cleared by psychiatry prior to discharge.    Medications:    The patient is not currently taking any prescribed medications.    Past Medical History:    The patient denies a past medical history.     Physical Exam   Patient Vitals for the past 24 hrs:   BP Temp Temp src Pulse Resp SpO2   12/23/23 2308 120/77 98.6  F (37  C) Oral 100 16 96 %   12/23/23 2227 -- -- -- -- -- 99 %      Physical Exam  General: Laying on the ED bed in restraints  HEENT: Normocephalic, atraumatic  Cardiac: Well perfused  Pulm: Breathing comfortably, no accessory muscle usage, no conversational dyspnea  MSK: No bony deformities  Skin: Dry  Neuro: Awake and alert  Psych: Agitated, aggressive, not redirectable      Emergency Department Course     Laboratory:  Labs Ordered and Resulted from Time of ED Arrival to Time of ED Departure   COMPREHENSIVE METABOLIC PANEL - Abnormal       Result Value    Sodium 141      Potassium 3.7      Carbon Dioxide (CO2) 22      Anion Gap 16 (*)     Urea Nitrogen 14.4      Creatinine 0.77      GFR Estimate >90      Calcium 8.3 (*)     Chloride 103      Glucose 72      Alkaline Phosphatase 81       AST 31      ALT 21      Protein Total 7.3      Albumin 4.2      Bilirubin Total <0.2     ETHYL ALCOHOL LEVEL - Abnormal    Alcohol ethyl 0.28 (*)    ACETAMINOPHEN LEVEL - Abnormal    Acetaminophen <5.0 (*)    URINE DRUG SCREEN PANEL - Abnormal    Amphetamines Urine Screen Negative      Barbituates Urine Screen Negative      Benzodiazepine Urine Screen Positive (*)     Cannabinoids Urine Screen Positive (*)     Cocaine Urine Screen Negative      Fentanyl Qual Urine Screen Negative      Opiates Urine Screen Negative      PCP Urine Screen Negative     CBC WITH PLATELETS AND DIFFERENTIAL - Abnormal    WBC Count 6.3      RBC Count 3.43 (*)     Hemoglobin 10.5 (*)     Hematocrit 31.2 (*)     MCV 91      MCH 30.6      MCHC 33.7      RDW 13.9      Platelet Count 278      % Neutrophils 55      % Lymphocytes 39      % Monocytes 5      % Eosinophils 0      % Basophils 1      % Immature Granulocytes 0      NRBCs per 100 WBC 0      Absolute Neutrophils 3.5      Absolute Lymphocytes 2.4      Absolute Monocytes 0.3      Absolute Eosinophils 0.0      Absolute Basophils 0.0      Absolute Immature Granulocytes 0.0      Absolute NRBCs 0.0     HCG QUALITATIVE PREGNANCY - Normal    hCG Serum Qualitative Negative     SALICYLATE LEVEL - Normal    Salicylate <0.3          Emergency Department Course & Assessments:    Interventions:  Medications   haloperidol lactate (HALDOL) injection 10 mg (10 mg Intramuscular $Given 12/23/23 2226)   midazolam (VERSED) injection 2 mg (2 mg Intramuscular $Given 12/23/23 2227)   midazolam (VERSED) injection 2.5 mg (2.5 mg Intramuscular Not Given 12/24/23 0027)      Assessments:  2232 I obtained history and examined the patient as noted above.  2256 I rechecked the patient's status and informed them of the findings.     Independent Interpretation (X-rays, CTs, rhythm strip):  None    Consultations/Discussion of Management or Tests:  None    Social Determinants of Health affecting care:  None      Disposition:  Care of the patient was transferred to my colleague Dr. Stephens pending reevaluation once sober, likely DEC consultation.     Impression & Plan    CMS Diagnoses: None    Medical Decision Makin-year-old female presents with alcohol intoxication and reported suicidal gestures.  She is agitated upon arrival, on a police hold, placed in restraints due to aggression towards staff.  She received medications as documented in the chart to facilitate better interaction with her environment.  There are no acute signs of trauma on exam.  Screening lab work was obtained and reassuring.  Plan at this time is to reevaluate once sober, consider DEC consultation if persistently suicidal.  Patient signed out to my colleague as above.    Critical Care time:  was 0 minutes for this patient excluding procedures.    Diagnosis:    ICD-10-CM    1. Alcohol abuse  F10.10       2. Suicide attempt by inadequate means, initial encounter (H)  X83.8XXA            Scribe Disclosure:    Stanley CHASE, am serving as a scribe at 10:35 PM on 2023 to document services personally performed by Prashant Zarate MD based on my observations and the provider's statements to me.    2023   Prashant Zarate MD King, Colin, MD  23 0224

## 2024-04-05 ENCOUNTER — APPOINTMENT (OUTPATIENT)
Dept: CT IMAGING | Facility: CLINIC | Age: 34
End: 2024-04-05
Attending: EMERGENCY MEDICINE
Payer: COMMERCIAL

## 2024-04-05 ENCOUNTER — HOSPITAL ENCOUNTER (OUTPATIENT)
Facility: CLINIC | Age: 34
Setting detail: OBSERVATION
Discharge: HOME | End: 2024-04-06
Attending: EMERGENCY MEDICINE | Admitting: INTERNAL MEDICINE
Payer: COMMERCIAL

## 2024-04-05 DIAGNOSIS — N17.9 ACUTE RENAL FAILURE, UNSPECIFIED ACUTE RENAL FAILURE TYPE (H): ICD-10-CM

## 2024-04-05 DIAGNOSIS — E87.29 HIGH ANION GAP METABOLIC ACIDOSIS: ICD-10-CM

## 2024-04-05 DIAGNOSIS — E87.6 HYPOKALEMIA: Primary | ICD-10-CM

## 2024-04-05 DIAGNOSIS — E83.39 HYPOPHOSPHATASIA: ICD-10-CM

## 2024-04-05 DIAGNOSIS — R11.2 NAUSEA AND VOMITING, UNSPECIFIED VOMITING TYPE: ICD-10-CM

## 2024-04-05 DIAGNOSIS — F10.10 ALCOHOL ABUSE: ICD-10-CM

## 2024-04-05 DIAGNOSIS — R10.9 RIGHT SIDED ABDOMINAL PAIN: ICD-10-CM

## 2024-04-05 LAB
ALBUMIN SERPL BCG-MCNC: 5.2 G/DL (ref 3.5–5.2)
ALBUMIN UR-MCNC: 300 MG/DL
ALP SERPL-CCNC: 133 U/L (ref 40–150)
ALT SERPL W P-5'-P-CCNC: 232 U/L (ref 0–50)
ANION GAP SERPL CALCULATED.3IONS-SCNC: 18 MMOL/L (ref 7–15)
ANION GAP SERPL CALCULATED.3IONS-SCNC: 22 MMOL/L (ref 7–15)
ANION GAP SERPL CALCULATED.3IONS-SCNC: 24 MMOL/L (ref 7–15)
ANION GAP SERPL CALCULATED.3IONS-SCNC: 41 MMOL/L (ref 7–15)
APPEARANCE UR: ABNORMAL
AST SERPL W P-5'-P-CCNC: 198 U/L (ref 0–45)
ATRIAL RATE - MUSE: 123 BPM
B-OH-BUTYR SERPL-SCNC: 7.5 MMOL/L
BASOPHILS # BLD AUTO: 0 10E3/UL (ref 0–0.2)
BASOPHILS NFR BLD AUTO: 0 %
BILIRUB SERPL-MCNC: 0.7 MG/DL
BILIRUB UR QL STRIP: ABNORMAL
BUN SERPL-MCNC: 25.6 MG/DL (ref 6–20)
BUN SERPL-MCNC: 29.3 MG/DL (ref 6–20)
BUN SERPL-MCNC: 31.7 MG/DL (ref 6–20)
BUN SERPL-MCNC: 34.6 MG/DL (ref 6–20)
CALCIUM SERPL-MCNC: 11 MG/DL (ref 8.6–10)
CALCIUM SERPL-MCNC: 8.3 MG/DL (ref 8.6–10)
CALCIUM SERPL-MCNC: 8.5 MG/DL (ref 8.6–10)
CALCIUM SERPL-MCNC: 8.6 MG/DL (ref 8.6–10)
CHLORIDE SERPL-SCNC: 80 MMOL/L (ref 98–107)
CHLORIDE SERPL-SCNC: 95 MMOL/L (ref 98–107)
CHLORIDE SERPL-SCNC: 96 MMOL/L (ref 98–107)
CHLORIDE SERPL-SCNC: 97 MMOL/L (ref 98–107)
CK SERPL-CCNC: 444 U/L (ref 26–192)
COLOR UR AUTO: YELLOW
CREAT SERPL-MCNC: 1.78 MG/DL (ref 0.51–0.95)
CREAT SERPL-MCNC: 2.33 MG/DL (ref 0.51–0.95)
CREAT SERPL-MCNC: 3.12 MG/DL (ref 0.51–0.95)
CREAT SERPL-MCNC: 4.79 MG/DL (ref 0.51–0.95)
CREAT UR-MCNC: 326.3 MG/DL
DEPRECATED HCO3 PLAS-SCNC: 13 MMOL/L (ref 22–29)
DEPRECATED HCO3 PLAS-SCNC: 19 MMOL/L (ref 22–29)
DEPRECATED HCO3 PLAS-SCNC: 20 MMOL/L (ref 22–29)
DEPRECATED HCO3 PLAS-SCNC: 24 MMOL/L (ref 22–29)
DIASTOLIC BLOOD PRESSURE - MUSE: NORMAL MMHG
EGFRCR SERPLBLD CKD-EPI 2021: 12 ML/MIN/1.73M2
EGFRCR SERPLBLD CKD-EPI 2021: 20 ML/MIN/1.73M2
EGFRCR SERPLBLD CKD-EPI 2021: 28 ML/MIN/1.73M2
EGFRCR SERPLBLD CKD-EPI 2021: 38 ML/MIN/1.73M2
EOSINOPHIL # BLD AUTO: 0 10E3/UL (ref 0–0.7)
EOSINOPHIL NFR BLD AUTO: 0 %
ERYTHROCYTE [DISTWIDTH] IN BLOOD BY AUTOMATED COUNT: 14.1 % (ref 10–15)
ETHANOL SERPL-MCNC: <0.01 G/DL
FRACT EXCRET NA UR+SERPL-RTO: 0.2 %
GLUCOSE SERPL-MCNC: 101 MG/DL (ref 70–99)
GLUCOSE SERPL-MCNC: 180 MG/DL (ref 70–99)
GLUCOSE SERPL-MCNC: 87 MG/DL (ref 70–99)
GLUCOSE SERPL-MCNC: 99 MG/DL (ref 70–99)
GLUCOSE UR STRIP-MCNC: NEGATIVE MG/DL
HCG SERPL QL: NEGATIVE
HCT VFR BLD AUTO: 47.2 % (ref 35–47)
HGB BLD-MCNC: 16.7 G/DL (ref 11.7–15.7)
HGB UR QL STRIP: ABNORMAL
HYALINE CASTS: 19 /LPF
IMM GRANULOCYTES # BLD: 0 10E3/UL
IMM GRANULOCYTES NFR BLD: 1 %
INTERPRETATION ECG - MUSE: NORMAL
KETONES UR STRIP-MCNC: 60 MG/DL
LACTATE SERPL-SCNC: 1 MMOL/L (ref 0.7–2)
LACTATE SERPL-SCNC: 2.9 MMOL/L (ref 0.7–2)
LEUKOCYTE ESTERASE UR QL STRIP: NEGATIVE
LIPASE SERPL-CCNC: 26 U/L (ref 13–60)
LYMPHOCYTES # BLD AUTO: 1.3 10E3/UL (ref 0.8–5.3)
LYMPHOCYTES NFR BLD AUTO: 22 %
MAGNESIUM SERPL-MCNC: 2.4 MG/DL (ref 1.7–2.3)
MCH RBC QN AUTO: 29.8 PG (ref 26.5–33)
MCHC RBC AUTO-ENTMCNC: 35.4 G/DL (ref 31.5–36.5)
MCV RBC AUTO: 84 FL (ref 78–100)
MONOCYTES # BLD AUTO: 0.4 10E3/UL (ref 0–1.3)
MONOCYTES NFR BLD AUTO: 7 %
MUCOUS THREADS #/AREA URNS LPF: PRESENT /LPF
NEUTROPHILS # BLD AUTO: 4.2 10E3/UL (ref 1.6–8.3)
NEUTROPHILS NFR BLD AUTO: 70 %
NITRATE UR QL: NEGATIVE
NRBC # BLD AUTO: 0 10E3/UL
NRBC BLD AUTO-RTO: 0 /100
P AXIS - MUSE: 79 DEGREES
PH UR STRIP: 6 [PH] (ref 5–7)
PLATELET # BLD AUTO: 379 10E3/UL (ref 150–450)
POTASSIUM SERPL-SCNC: 3.2 MMOL/L (ref 3.4–5.3)
POTASSIUM SERPL-SCNC: 3.2 MMOL/L (ref 3.4–5.3)
POTASSIUM SERPL-SCNC: 3.4 MMOL/L (ref 3.4–5.3)
PR INTERVAL - MUSE: 140 MS
PROT SERPL-MCNC: 10.4 G/DL (ref 6.4–8.3)
QRS DURATION - MUSE: 70 MS
QT - MUSE: 334 MS
QTC - MUSE: 478 MS
R AXIS - MUSE: 58 DEGREES
RBC # BLD AUTO: 5.61 10E6/UL (ref 3.8–5.2)
RBC URINE: 2 /HPF
SODIUM SERPL-SCNC: 134 MMOL/L (ref 135–145)
SODIUM SERPL-SCNC: 138 MMOL/L (ref 135–145)
SODIUM SERPL-SCNC: 138 MMOL/L (ref 135–145)
SODIUM SERPL-SCNC: 139 MMOL/L (ref 135–145)
SODIUM UR-SCNC: 48 MMOL/L
SP GR UR STRIP: 1.02 (ref 1–1.03)
SQUAMOUS EPITHELIAL: 2 /HPF
SYSTOLIC BLOOD PRESSURE - MUSE: NORMAL MMHG
T AXIS - MUSE: 95 DEGREES
UROBILINOGEN UR STRIP-MCNC: 3 MG/DL
VENTRICULAR RATE- MUSE: 123 BPM
WBC # BLD AUTO: 6 10E3/UL (ref 4–11)
WBC URINE: 8 /HPF

## 2024-04-05 PROCEDURE — 250N000013 HC RX MED GY IP 250 OP 250 PS 637: Performed by: INTERNAL MEDICINE

## 2024-04-05 PROCEDURE — 93005 ELECTROCARDIOGRAM TRACING: CPT

## 2024-04-05 PROCEDURE — 36415 COLL VENOUS BLD VENIPUNCTURE: CPT | Performed by: EMERGENCY MEDICINE

## 2024-04-05 PROCEDURE — 258N000003 HC RX IP 258 OP 636: Performed by: INTERNAL MEDICINE

## 2024-04-05 PROCEDURE — 120N000001 HC R&B MED SURG/OB

## 2024-04-05 PROCEDURE — 80048 BASIC METABOLIC PNL TOTAL CA: CPT | Performed by: INTERNAL MEDICINE

## 2024-04-05 PROCEDURE — 83690 ASSAY OF LIPASE: CPT | Performed by: EMERGENCY MEDICINE

## 2024-04-05 PROCEDURE — 82010 KETONE BODYS QUAN: CPT | Performed by: EMERGENCY MEDICINE

## 2024-04-05 PROCEDURE — 96361 HYDRATE IV INFUSION ADD-ON: CPT

## 2024-04-05 PROCEDURE — 250N000011 HC RX IP 250 OP 636: Performed by: INTERNAL MEDICINE

## 2024-04-05 PROCEDURE — 81001 URINALYSIS AUTO W/SCOPE: CPT | Performed by: INTERNAL MEDICINE

## 2024-04-05 PROCEDURE — 83605 ASSAY OF LACTIC ACID: CPT | Performed by: EMERGENCY MEDICINE

## 2024-04-05 PROCEDURE — 96366 THER/PROPH/DIAG IV INF ADDON: CPT

## 2024-04-05 PROCEDURE — 74176 CT ABD & PELVIS W/O CONTRAST: CPT

## 2024-04-05 PROCEDURE — 85025 COMPLETE CBC W/AUTO DIFF WBC: CPT | Performed by: EMERGENCY MEDICINE

## 2024-04-05 PROCEDURE — 99285 EMERGENCY DEPT VISIT HI MDM: CPT | Mod: 25

## 2024-04-05 PROCEDURE — 84132 ASSAY OF SERUM POTASSIUM: CPT | Performed by: INTERNAL MEDICINE

## 2024-04-05 PROCEDURE — 82570 ASSAY OF URINE CREATININE: CPT | Performed by: INTERNAL MEDICINE

## 2024-04-05 PROCEDURE — C9113 INJ PANTOPRAZOLE SODIUM, VIA: HCPCS | Performed by: EMERGENCY MEDICINE

## 2024-04-05 PROCEDURE — 36415 COLL VENOUS BLD VENIPUNCTURE: CPT | Performed by: INTERNAL MEDICINE

## 2024-04-05 PROCEDURE — 82077 ASSAY SPEC XCP UR&BREATH IA: CPT | Performed by: EMERGENCY MEDICINE

## 2024-04-05 PROCEDURE — 96375 TX/PRO/DX INJ NEW DRUG ADDON: CPT

## 2024-04-05 PROCEDURE — 99223 1ST HOSP IP/OBS HIGH 75: CPT | Performed by: INTERNAL MEDICINE

## 2024-04-05 PROCEDURE — 80053 COMPREHEN METABOLIC PANEL: CPT | Performed by: EMERGENCY MEDICINE

## 2024-04-05 PROCEDURE — 258N000003 HC RX IP 258 OP 636: Performed by: EMERGENCY MEDICINE

## 2024-04-05 PROCEDURE — 82550 ASSAY OF CK (CPK): CPT | Performed by: INTERNAL MEDICINE

## 2024-04-05 PROCEDURE — 83735 ASSAY OF MAGNESIUM: CPT | Performed by: EMERGENCY MEDICINE

## 2024-04-05 PROCEDURE — 96365 THER/PROPH/DIAG IV INF INIT: CPT

## 2024-04-05 PROCEDURE — 84703 CHORIONIC GONADOTROPIN ASSAY: CPT | Performed by: INTERNAL MEDICINE

## 2024-04-05 PROCEDURE — 250N000009 HC RX 250: Performed by: EMERGENCY MEDICINE

## 2024-04-05 PROCEDURE — 250N000011 HC RX IP 250 OP 636: Performed by: EMERGENCY MEDICINE

## 2024-04-05 RX ORDER — CALCIUM CARBONATE 500 MG/1
1000 TABLET, CHEWABLE ORAL 4 TIMES DAILY PRN
Status: DISCONTINUED | OUTPATIENT
Start: 2024-04-05 | End: 2024-04-06 | Stop reason: HOSPADM

## 2024-04-05 RX ORDER — THIAMINE HYDROCHLORIDE 100 MG/ML
100 INJECTION, SOLUTION INTRAMUSCULAR; INTRAVENOUS DAILY
Status: DISCONTINUED | OUTPATIENT
Start: 2024-04-05 | End: 2024-04-06 | Stop reason: HOSPADM

## 2024-04-05 RX ORDER — ONDANSETRON 2 MG/ML
4 INJECTION INTRAMUSCULAR; INTRAVENOUS EVERY 6 HOURS PRN
Status: DISCONTINUED | OUTPATIENT
Start: 2024-04-05 | End: 2024-04-06 | Stop reason: HOSPADM

## 2024-04-05 RX ORDER — ONDANSETRON 4 MG/1
4 TABLET, ORALLY DISINTEGRATING ORAL EVERY 6 HOURS PRN
Status: DISCONTINUED | OUTPATIENT
Start: 2024-04-05 | End: 2024-04-06 | Stop reason: HOSPADM

## 2024-04-05 RX ORDER — ONDANSETRON 2 MG/ML
4 INJECTION INTRAMUSCULAR; INTRAVENOUS ONCE
Status: COMPLETED | OUTPATIENT
Start: 2024-04-05 | End: 2024-04-05

## 2024-04-05 RX ORDER — LIDOCAINE 40 MG/G
CREAM TOPICAL
Status: DISCONTINUED | OUTPATIENT
Start: 2024-04-05 | End: 2024-04-06 | Stop reason: HOSPADM

## 2024-04-05 RX ORDER — POTASSIUM CHLORIDE 1500 MG/1
40 TABLET, EXTENDED RELEASE ORAL ONCE
Status: COMPLETED | OUTPATIENT
Start: 2024-04-05 | End: 2024-04-05

## 2024-04-05 RX ORDER — AMOXICILLIN 250 MG
1 CAPSULE ORAL 2 TIMES DAILY PRN
Status: DISCONTINUED | OUTPATIENT
Start: 2024-04-05 | End: 2024-04-06 | Stop reason: HOSPADM

## 2024-04-05 RX ORDER — SODIUM CHLORIDE, SODIUM LACTATE, POTASSIUM CHLORIDE, CALCIUM CHLORIDE 600; 310; 30; 20 MG/100ML; MG/100ML; MG/100ML; MG/100ML
INJECTION, SOLUTION INTRAVENOUS CONTINUOUS
Status: DISCONTINUED | OUTPATIENT
Start: 2024-04-05 | End: 2024-04-06 | Stop reason: HOSPADM

## 2024-04-05 RX ORDER — AMOXICILLIN 250 MG
2 CAPSULE ORAL 2 TIMES DAILY PRN
Status: DISCONTINUED | OUTPATIENT
Start: 2024-04-05 | End: 2024-04-06 | Stop reason: HOSPADM

## 2024-04-05 RX ADMIN — SODIUM CHLORIDE, POTASSIUM CHLORIDE, SODIUM LACTATE AND CALCIUM CHLORIDE: 600; 310; 30; 20 INJECTION, SOLUTION INTRAVENOUS at 08:54

## 2024-04-05 RX ADMIN — ONDANSETRON 4 MG: 2 INJECTION INTRAMUSCULAR; INTRAVENOUS at 05:38

## 2024-04-05 RX ADMIN — SODIUM CHLORIDE, POTASSIUM CHLORIDE, SODIUM LACTATE AND CALCIUM CHLORIDE: 600; 310; 30; 20 INJECTION, SOLUTION INTRAVENOUS at 15:34

## 2024-04-05 RX ADMIN — ONDANSETRON 4 MG: 4 TABLET, ORALLY DISINTEGRATING ORAL at 14:39

## 2024-04-05 RX ADMIN — POTASSIUM CHLORIDE 40 MEQ: 1500 TABLET, EXTENDED RELEASE ORAL at 19:26

## 2024-04-05 RX ADMIN — FOLIC ACID: 5 INJECTION, SOLUTION INTRAMUSCULAR; INTRAVENOUS; SUBCUTANEOUS at 06:09

## 2024-04-05 RX ADMIN — SODIUM CHLORIDE 1000 ML: 9 INJECTION, SOLUTION INTRAVENOUS at 05:38

## 2024-04-05 RX ADMIN — PANTOPRAZOLE SODIUM 40 MG: 40 INJECTION, POWDER, FOR SOLUTION INTRAVENOUS at 05:40

## 2024-04-05 RX ADMIN — SODIUM CHLORIDE, POTASSIUM CHLORIDE, SODIUM LACTATE AND CALCIUM CHLORIDE: 600; 310; 30; 20 INJECTION, SOLUTION INTRAVENOUS at 21:48

## 2024-04-05 ASSESSMENT — ACTIVITIES OF DAILY LIVING (ADL)
ADLS_ACUITY_SCORE: 35
ADLS_ACUITY_SCORE: 36
ADLS_ACUITY_SCORE: 35
ADLS_ACUITY_SCORE: 21
ADLS_ACUITY_SCORE: 19
ADLS_ACUITY_SCORE: 35
ADLS_ACUITY_SCORE: 35
ADLS_ACUITY_SCORE: 36
ADLS_ACUITY_SCORE: 36
ADLS_ACUITY_SCORE: 35
ADLS_ACUITY_SCORE: 21
ADLS_ACUITY_SCORE: 36
ADLS_ACUITY_SCORE: 35
ADLS_ACUITY_SCORE: 19
ADLS_ACUITY_SCORE: 35
ADLS_ACUITY_SCORE: 21

## 2024-04-05 ASSESSMENT — COLUMBIA-SUICIDE SEVERITY RATING SCALE - C-SSRS
2. HAVE YOU ACTUALLY HAD ANY THOUGHTS OF KILLING YOURSELF IN THE PAST MONTH?: NO
1. IN THE PAST MONTH, HAVE YOU WISHED YOU WERE DEAD OR WISHED YOU COULD GO TO SLEEP AND NOT WAKE UP?: NO
6. HAVE YOU EVER DONE ANYTHING, STARTED TO DO ANYTHING, OR PREPARED TO DO ANYTHING TO END YOUR LIFE?: NO

## 2024-04-05 NOTE — PROGRESS NOTES
UPDATED PLAN OF CARE FOR 04/05/24    Patient doing better. Less abd pain and nausea. Wanting to advance diet. Renal function improving with IVF.    PLAN  -Continue LR@150ml/hr  -Advance diet as tolerated  -Trend renal function  -Needs to quit drinking alcohol    Micheal Caro MD

## 2024-04-05 NOTE — ED TRIAGE NOTES
Pt to ER w c/o N/V, abdominal pain, chest pain, dizziness x4 days. Pt states her brother was sick a couple weeks ago. Denies fever and diarrhea. Pt's mother states she drinks every day. Pt denies that she drinks ever day and won't tell me how much she drinks but per pt's mother pt was drinking heavy over the weekend and pt drinks daily. VSS except tachy 140s, ABCs intact, A&Ox4.

## 2024-04-05 NOTE — H&P
[facility]    History and Physical - Hospitalist Service       Date of Admission:  4/5/2024    Assessment & Plan      Karlo Fuentes is a 33 year old female admitted on 4/5/2024. She has a history of chronic alcohol use, alcoholic pancreatitis, marijuana use who presents with nausea, vomiting, abdominal pain, chest pain and dizziness for 4 days.  She denies any fever or diarrhea.  She does not endorse how much she drinks but per her mother she drinks daily and was drinking heavily over the weekend.  She states her symptoms have been going on since Monday.  Her emesis is mainly watery with a few streaks of blood.  She does have some epigastric pain radiating to her back but feels this is different from prior pancreatitis.  She denies any fevers.  She does endorse being dizzy.    Acute renal failure.  -Likely prerenal.  -Her only alcohol intake is vodka.  -FEna.  -IV fluids  -Periodic BMPs.  -No NSAID use.  -If no improvement consider nephrology involvement.    2.  Alcohol abuse disorder.  -IV thiamine.  -Monitor for withdrawal  -Will hold off on CIWA for now.  -Will need counseling on cessation.  -Anion gap metabolic acidosis likely as a result of starvation and alcoholic ketosis.            Diet:  ADAT.  DVT Prophylaxis: Pneumatic Compression Devices  Arguelles Catheter: Not present  Lines: None     Cardiac Monitoring: None  Code Status:  Full Code.    Clinically Significant Risk Factors Present on Admission           # Hypercalcemia: Highest Ca = 11 mg/dL in last 2 days, will monitor as appropriate   # Anion Gap Metabolic Acidosis: Highest Anion Gap = 41 mmol/L in last 2 days, will monitor and treat as appropriate      # Hypertension: Noted on problem list                 Disposition Plan           Gabbie Diaz MD  Hospitalist Service  [facility]  Securely message with [employer] (more info)  Text page via [employer] Paging/Directory      ______________________________________________________________________    Chief Complaint     N/V/abdominal pain, chest pain and dizziness.    History is obtained from the patient    History of Present Illness   Karlo Fuentes is a 33 year old female who has a history of chronic alcohol use, alcoholic pancreatitis, marijuana use who presents with nausea, vomiting, abdominal pain, chest pain and dizziness for 4 days.  She denies any fever or diarrhea.  She does not endorse how much she drinks but per her mother she drinks daily and was drinking heavily over the weekend.      Past Medical History      See above.    Past Surgical History     None.    Prior to Admission Medications   None        Review of Systems    The 10 point Review of Systems is negative other than noted in the HPI or here.  Her chest pain is noncardiac and has been present for years.    Social History   I have reviewed this patient's social history and updated it with pertinent information if needed.     + vapes and Binge drinks Vodka couple of times/week. Last drink Monday.    Family History       Reviewed.      Allergies   No Known Allergies     Physical Exam   Vital Signs: Temp: 98.1  F (36.7  C) Temp src: Temporal BP: (!) 122/99 Pulse: (!) 142   Resp: 18 SpO2: 100 % O2 Device: None (Room air)    Weight: 0 lbs 0 oz    Gen - AAO x 3.  HEENT - dry MM.  Lungs - CTA B.  Heart - tachycardic, S1+S2 nml, no m/g/r.  Abd - soft, NT, ND, + BS.  Ext - no edema.    Medical Decision Making       80 MINUTES SPENT BY ME on the date of service doing chart review, history, exam, documentation & further activities per the note.      Data     I have personally reviewed the following data over the past 24 hrs:    6.0  \   16.7 (H)   / 379     134 (L) 80 (L) 34.6 (H) /  180 (H)   3.4 13 (L) 4.79 (H) \     ALT: 232 (H) AST: 198 (H) AP: 133 TBILI: 0.7   ALB: 5.2 TOT PROTEIN: 10.4 (H) LIPASE: 26     Procal: N/A CRP: N/A Lactic Acid: 2.9 (H)         Imaging results  reviewed over the past 24 hrs:   No results found for this or any previous visit (from the past 24 hour(s)).

## 2024-04-05 NOTE — PROGRESS NOTES
Arrived to room 600 from ER at via cart, alert and oriented x 4, oriented to room and call system, IV patent and infusing. rates pain 3/10, reviewed welcome folder and pain/medication information packet with patient. Admission profile started.

## 2024-04-05 NOTE — ED PROVIDER NOTES
History     Chief Complaint:  Alcohol Intoxication     The history is provided by the patient.      Karlo Fuentes is a 33 year old female with a history of ETOH abuse, ETOH ketosis, acute renal failure, hypertension, and fatty liver who presents to the emergency department for alcohol intoxication. The patient states that for 3 days, she has been experiencing new diffuse abdominal pain and frequent vomiting. She is unsure if there was blood in her vomit as she vomited after drinking tea. She notes that she has a decreased food intake but has been drinking fluids. She reports that she has also been drinking ETOH frequently for approximately 1 year. She adds that she drinks a pint of ETOH daily with her last drink 4 days ago. She adds that she has a history of ETOH-related diagnoses. Denies history of ETOH withdrawal. Denies abdominal bloating. Denies diarrhea. Denies new chest pain. Denies black or bloody stools. She has a history of hypertension and notes that she does not take any prescribed medications for this.    Independent Historian:   None - Patient Only    Review of External Notes:   No recent outpatient clinic notes available for review.  Previous ED visits demonstrate admissions with alcohol is a significant factor.    Medications:    No current outpatient medications on file.    Past Medical History:    ETOH abuse  Acute renal failure  Hypertension  ETOH ketosis  Fatty liver  Adjustment disorder    Physical Exam   Patient Vitals for the past 24 hrs:   BP Temp Temp src Pulse Resp SpO2   04/05/24 0609 (!) 141/109 -- -- 105 -- --   04/05/24 0508 (!) 122/99 98.1  F (36.7  C) Temporal (!) 142 18 100 %      Physical Exam  General: Uncomfortable appearing adult female, laying on the stretcher  Eyes: PERRL, Conjunctive within normal limits.  No scleral icterus.  ENT: Mois dry cracked lips, dry mucous membranes, oropharynx clear.   CV: Normal S1S2, no murmur, rub or gallop.  Tachycardic, regular.    Resp: Clear  to auscultation bilaterally, no wheezes, rales or rhonchi. Normal respiratory effort.  GI: Abdomen is soft and mildly distended.  Diffuse right-sided tenderness to palpation.  Epigastric tenderness to palpation.   No palpable masses. No rebound or guarding.  MSK: No edema. Nontender. Normal active range of motion.  Skin: Warm and dry. No rashes or lesions or ecchymoses on visible skin.  Neuro: Alert and oriented. Responds appropriately to all questions and commands. No focal findings appreciated. Normal muscle tone.  Psych: Appropriate    Emergency Department Course   ECG  ECG results from 04/05/24   EKG 12 lead     Value    Systolic Blood Pressure     Diastolic Blood Pressure     Ventricular Rate 123    Atrial Rate 123    TN Interval 140    QRS Duration 70        QTc 478    P Axis 79    R AXIS 58    T Axis 95    Interpretation ECG      Sinus tachycardia  Right atrial enlargement  Left ventricular hypertrophy with repolarization abnormality ( Sokolow-Bonilla )  Abnormal ECG  No previous ECGs available       Imaging:  CT Abdomen Pelvis w/o Contrast   Final Result   IMPRESSION:    1.  Negative.            Read by radiologist.    Laboratory:  Labs Ordered and Resulted from Time of ED Arrival to Time of ED Departure   COMPREHENSIVE METABOLIC PANEL - Abnormal       Result Value    Sodium 134 (*)     Potassium 3.4      Carbon Dioxide (CO2) 13 (*)     Anion Gap 41 (*)     Urea Nitrogen 34.6 (*)     Creatinine 4.79 (*)     GFR Estimate 12 (*)     Calcium 11.0 (*)     Chloride 80 (*)     Glucose 180 (*)     Alkaline Phosphatase 133       (*)      (*)     Protein Total 10.4 (*)     Albumin 5.2      Bilirubin Total 0.7     LACTIC ACID WHOLE BLOOD - Abnormal    Lactic Acid 2.9 (*)    MAGNESIUM - Abnormal    Magnesium 2.4 (*)    CBC WITH PLATELETS AND DIFFERENTIAL - Abnormal    WBC Count 6.0      RBC Count 5.61 (*)     Hemoglobin 16.7 (*)     Hematocrit 47.2 (*)     MCV 84      MCH 29.8      MCHC 35.4      RDW  14.1      Platelet Count 379      % Neutrophils 70      % Lymphocytes 22      % Monocytes 7      % Eosinophils 0      % Basophils 0      % Immature Granulocytes 1      NRBCs per 100 WBC 0      Absolute Neutrophils 4.2      Absolute Lymphocytes 1.3      Absolute Monocytes 0.4      Absolute Eosinophils 0.0      Absolute Basophils 0.0      Absolute Immature Granulocytes 0.0      Absolute NRBCs 0.0     LIPASE - Normal    Lipase 26     ETHYL ALCOHOL LEVEL - Normal    Alcohol ethyl <0.01     KETONE BETA-HYDROXYBUTYRATE QUANTITATIVE, RAPID      Emergency Department Course & Assessments:    Interventions:  Medications   sodium chloride 0.9% BOLUS 1,000 mL (1,000 mLs Intravenous $New Bag 4/5/24 0538)   sodium chloride 0.9 % 1,000 mL with Infuvite Adult 10 mL, thiamine 100 mg, folic acid 1 mg infusion ( Intravenous $New Bag 4/5/24 0609)   ondansetron (ZOFRAN) injection 4 mg (4 mg Intravenous $Given 4/5/24 0538)   pantoprazole (PROTONIX) IV push injection 40 mg (40 mg Intravenous $Given 4/5/24 0540)      Assessments:  0525 I obtained history and examined the patient as noted above.   I reassessed the patient.  Her heart rate is improved.  She notes feeling more comfortable.  I discussed results of today's testing and admission which she is agreeable to.  All questions were answered.    Independent Interpretation (X-rays, CTs, rhythm strip):  None    Consultations/Discussion of Management or Tests:  I discussed the patient with Dr. Cortez of the hospitalist service regarding admission.  He accepts the patient to his care.    Social Determinants of Health affecting care:   None    Disposition:  The patient was admitted to the hospital under the care of Dr. Diaz.     Impression & Plan      Medical Decision Making:  Karlo Fuentes is a 33-year-old female with a history of alcohol abuse and sequelae of alcohol abuse with multiple admissions due to complications of her abuse who presents to the emergency department with multiple  days of vomiting and abdominal pain in the setting of a binge drinking episode over the weekend.  On arrival she is tachycardic and mildly hypertensive.  She had tenderness predominantly in the right upper abdomen but also in the right lower abdomen.  She appeared clinically dehydrated.  She was resuscitated with IV fluid.  She was given supplementation of folic acid and thiamine.  She was not febrile or altered.  Her heart rate improved with IV fluids.  She was noted to have an high anion gap acidosis with elevated lactic, ketones pending, with suspicion for ketosis as a factor in the anion gap acidosis.  CT scan fortunately did not show any acute abdominal pathology.  Given her reports, I do clinically suspect gastritis without signs of massive hemorrhage at this time.  Protonix was administered.  She has noted to have acute renal failure, likely secondary to hypovolemia in the setting of persistent days of vomiting and poor oral intake.  She will be admitted for ongoing care to a medical bed given the severity of her illness.  She did not decompensate and there is no sign at this time of significant alcohol withdrawal, although it is acknowledged that some of the symptoms could overlap.  The patient was comfortable with the plan for admission.  All questions were answered.    Diagnosis:    ICD-10-CM    1. Acute renal failure, unspecified acute renal failure type (H24)  N17.9       2. Nausea and vomiting, unspecified vomiting type  R11.2       3. High anion gap metabolic acidosis  E87.29       4. Right sided abdominal pain  R10.9       5. Alcohol abuse  F10.10            Scribe Disclosure:  SALVATORE, Orlando Rosas, am serving as a scribe at 5:24 AM on 4/5/2024 to document services personally performed by Page Perez MD based on my observations and the provider's statements to me.     4/5/2024   Page Perez MD Jonkman, Tracy Dianne, MD  04/05/24 0675

## 2024-04-05 NOTE — PLAN OF CARE
"  Problem: Adult Inpatient Plan of Care  Goal: Plan of Care Review  Description: The Plan of Care Review/Shift note should be completed every shift.  The Outcome Evaluation is a brief statement about your assessment that the patient is improving, declining, or no change.  This information will be displayed automatically on your shift  note.  Outcome: Progressing  Flowsheets (Taken 4/5/2024 1506)  Outcome Evaluation: Patient is A/O X4. vss, on RA. CIWA scored 2. N=Zodran given for N/V and was effective. on Tele. on clear liquid diet. LR infusing. will continue monitoring.  Plan of Care Reviewed With: patient  Overall Patient Progress: no change  Goal: Patient-Specific Goal (Individualized)  Description: You can add care plan individualizations to a care plan. Examples of Individualization might be:  \"Parent requests to be called daily at 9am for status\", \"I have a hard time hearing out of my right ear\", or \"Do not touch me to wake me up as it startles  me\".  Outcome: Progressing  Goal: Absence of Hospital-Acquired Illness or Injury  Outcome: Progressing  Intervention: Identify and Manage Fall Risk  Recent Flowsheet Documentation  Taken 4/5/2024 1441 by Violette Chavez RN  Safety Promotion/Fall Prevention:   activity supervised   room door open   safety round/check completed   lighting adjusted  Intervention: Prevent Skin Injury  Recent Flowsheet Documentation  Taken 4/5/2024 1441 by Violette Chavez RN  Body Position: position changed independently  Intervention: Prevent Infection  Recent Flowsheet Documentation  Taken 4/5/2024 1441 by Violette Chavez RN  Infection Prevention:   hand hygiene promoted   single patient room provided  Goal: Optimal Comfort and Wellbeing  Outcome: Progressing  Intervention: Monitor Pain and Promote Comfort  Recent Flowsheet Documentation  Taken 4/5/2024 1414 by Violette Chavez RN  Pain Management Interventions:   declines   rest  Goal: Readiness for Transition of Care  Outcome: " Progressing     Problem: Skin Injury Risk Increased  Goal: Skin Health and Integrity  Outcome: Progressing  Intervention: Optimize Skin Protection  Recent Flowsheet Documentation  Taken 4/5/2024 1441 by Violette Chavez, RN  Activity Management:   activity adjusted per tolerance   activity encouraged  Head of Bed (HOB) Positioning: HOB at 20-30 degrees   Goal Outcome Evaluation:      Plan of Care Reviewed With: patient    Overall Patient Progress: no change    Outcome Evaluation: Patient is A/O X4. vss, on RA. CIWA scored 2. N=Zodran given for N/V and was effective. on Tele. on clear liquid diet. LR infusing. will continue monitoring.

## 2024-04-05 NOTE — PHARMACY-ADMISSION MEDICATION HISTORY
Pharmacy Intern Admission Medication History    Admission medication history is complete. The information provided in this note is only as accurate as the sources available at the time of the update.    Information Source(s): Patient via in-person    Pertinent Information: None    Changes made to PTA medication list:  Added: None  Deleted: None  Changed: None    Allergies reviewed with patient and updates made in EHR: yes    Medication History Completed By: Agusto Lim 4/5/2024 9:19 AM    No outpatient medications have been marked as taking for the 4/5/24 encounter (Hospital Encounter).

## 2024-04-05 NOTE — PROGRESS NOTES
[facility]    ED Boarding Nurse Handoff Addendum Report:    Date/time: 4/5/2024, 1:54 PM    Activity Level: standby    Fall Risk: No    Active Infusions: LR @ 150    Current Meds Due: none    Current care needs: IVF, antiemetics    Oxygen requirements (liters/min and/or FiO2): none    Respiratory status: Room air    Vital signs (within last 30 minutes):    Vitals:    04/05/24 0749 04/05/24 0800 04/05/24 1123 04/05/24 1351   BP:  (!) 161/100 (!) 147/107 (!) 131/92   Pulse:  115 95 87   Resp:  18 16 16   Temp:  98  F (36.7  C) 98.5  F (36.9  C) 98  F (36.7  C)   TempSrc:  Axillary Oral Oral   SpO2: 100% 97%         Focused assessment within last 30 minutes:    A&Ox4, VSS on RA. Diet advanced to clear liquid. Ambulated SBA to bathroom, void x1. Emesis x1 during shift. PIV infusing     ED Boarding Nurse name: Marie Nicolas RN

## 2024-04-05 NOTE — ED NOTES
[facility]  ED Nurse Handoff Report    ED Chief complaint: Alcohol Intoxication  . ED Diagnosis:   Final diagnoses:   Acute renal failure, unspecified acute renal failure type (H24)   Nausea and vomiting, unspecified vomiting type   High anion gap metabolic acidosis   Right sided abdominal pain   Alcohol abuse       Allergies: No Known Allergies    Code Status: Full Code    Activity level - Baseline/Home:  independent.  Activity Level - Current:   standby.   Lift room needed: No.   Bariatric: No   Needed: No   Isolation: No.   Infection: Not Applicable.     Respiratory status: Room air    Vital Signs (within 30 minutes):   Vitals:    04/05/24 0508 04/05/24 0609   BP: (!) 122/99 (!) 141/109   Pulse: (!) 142 105   Resp: 18    Temp: 98.1  F (36.7  C)    TempSrc: Temporal    SpO2: 100%        Cardiac Rhythm:  ,      Pain level:    Patient confused: No.   Patient Falls Risk: nonskid shoes/slippers when out of bed.   Elimination Status: Unable to void     Patient Report - Initial Complaint: Pt to ER with c/o abd pain and ETOH.   Focused Assessment: Pt with hx of pancreatitis comes in with abd pain n/v dehydration     Abnormal Results:   Labs Ordered and Resulted from Time of ED Arrival to Time of ED Departure   COMPREHENSIVE METABOLIC PANEL - Abnormal       Result Value    Sodium 134 (*)     Potassium 3.4      Carbon Dioxide (CO2) 13 (*)     Anion Gap 41 (*)     Urea Nitrogen 34.6 (*)     Creatinine 4.79 (*)     GFR Estimate 12 (*)     Calcium 11.0 (*)     Chloride 80 (*)     Glucose 180 (*)     Alkaline Phosphatase 133       (*)      (*)     Protein Total 10.4 (*)     Albumin 5.2      Bilirubin Total 0.7     LACTIC ACID WHOLE BLOOD - Abnormal    Lactic Acid 2.9 (*)    MAGNESIUM - Abnormal    Magnesium 2.4 (*)    CBC WITH PLATELETS AND DIFFERENTIAL - Abnormal    WBC Count 6.0      RBC Count 5.61 (*)     Hemoglobin 16.7 (*)     Hematocrit 47.2 (*)     MCV 84      MCH 29.8       MCHC 35.4      RDW 14.1      Platelet Count 379      % Neutrophils 70      % Lymphocytes 22      % Monocytes 7      % Eosinophils 0      % Basophils 0      % Immature Granulocytes 1      NRBCs per 100 WBC 0      Absolute Neutrophils 4.2      Absolute Lymphocytes 1.3      Absolute Monocytes 0.4      Absolute Eosinophils 0.0      Absolute Basophils 0.0      Absolute Immature Granulocytes 0.0      Absolute NRBCs 0.0     LIPASE - Normal    Lipase 26     ETHYL ALCOHOL LEVEL - Normal    Alcohol ethyl <0.01     KETONE BETA-HYDROXYBUTYRATE QUANTITATIVE, RAPID        CT Abdomen Pelvis w/o Contrast   Final Result   IMPRESSION:    1.  Negative.             Treatments provided: Meds IVFs  Family Comments: here by herself  OBS brochure/video discussed/provided to patient:  Yes  ED Medications:   Medications   sodium chloride 0.9% BOLUS 1,000 mL (1,000 mLs Intravenous $New Bag 4/5/24 0538)   sodium chloride 0.9 % 1,000 mL with Infuvite Adult 10 mL, thiamine 100 mg, folic acid 1 mg infusion ( Intravenous $New Bag 4/5/24 8588)   ondansetron (ZOFRAN) injection 4 mg (4 mg Intravenous $Given 4/5/24 0538)   pantoprazole (PROTONIX) IV push injection 40 mg (40 mg Intravenous $Given 4/5/24 9140)       Drips infusing:  No  For the majority of the shift this patient was Green.   Interventions performed wereN/A.    Sepsis treatment initiated: No    Cares/treatment/interventions/medications to be completed following ED care: See [facility]    ED Nurse Name: Anay Whittaker RN  6:55 AM   RECEIVING UNIT ED HANDOFF REVIEW    Above ED Nurse Handoff Report was reviewed: Yes  Reviewed by: Violette Chavez RN on April 5, 2024 at 1:19 PM   SALVATORE Aguillon called the ED to inform them the note was read: Yes

## 2024-04-06 VITALS
TEMPERATURE: 96.8 F | BODY MASS INDEX: 22.92 KG/M2 | SYSTOLIC BLOOD PRESSURE: 109 MMHG | HEART RATE: 80 BPM | WEIGHT: 134.26 LBS | DIASTOLIC BLOOD PRESSURE: 78 MMHG | HEIGHT: 64 IN | OXYGEN SATURATION: 100 % | RESPIRATION RATE: 16 BRPM

## 2024-04-06 PROBLEM — E87.6 HYPOKALEMIA: Status: ACTIVE | Noted: 2024-04-06

## 2024-04-06 PROBLEM — E83.39 HYPOPHOSPHATASIA: Status: ACTIVE | Noted: 2024-04-06

## 2024-04-06 LAB
ACANTHOCYTES BLD QL SMEAR: NORMAL
ANION GAP SERPL CALCULATED.3IONS-SCNC: 14 MMOL/L (ref 7–15)
AUER BODIES BLD QL SMEAR: NORMAL
BASO STIPL BLD QL SMEAR: NORMAL
BASOPHILS # BLD AUTO: 0 10E3/UL (ref 0–0.2)
BASOPHILS NFR BLD AUTO: 1 %
BITE CELLS BLD QL SMEAR: NORMAL
BLISTER CELLS BLD QL SMEAR: NORMAL
BUN SERPL-MCNC: 14 MG/DL (ref 6–20)
BURR CELLS BLD QL SMEAR: NORMAL
CALCIUM SERPL-MCNC: 8.2 MG/DL (ref 8.6–10)
CHLORIDE SERPL-SCNC: 100 MMOL/L (ref 98–107)
CREAT SERPL-MCNC: 0.92 MG/DL (ref 0.51–0.95)
DACRYOCYTES BLD QL SMEAR: NORMAL
DEPRECATED HCO3 PLAS-SCNC: 23 MMOL/L (ref 22–29)
EGFRCR SERPLBLD CKD-EPI 2021: 85 ML/MIN/1.73M2
ELLIPTOCYTES BLD QL SMEAR: NORMAL
EOSINOPHIL # BLD AUTO: 0 10E3/UL (ref 0–0.7)
EOSINOPHIL NFR BLD AUTO: 1 %
ERYTHROCYTE [DISTWIDTH] IN BLOOD BY AUTOMATED COUNT: 13.9 % (ref 10–15)
FRAGMENTS BLD QL SMEAR: NORMAL
GLUCOSE SERPL-MCNC: 128 MG/DL (ref 70–99)
HCT VFR BLD AUTO: 31.8 % (ref 35–47)
HGB BLD-MCNC: 11.2 G/DL (ref 11.7–15.7)
HGB C CRYSTALS: NORMAL
HOWELL-JOLLY BOD BLD QL SMEAR: NORMAL
IMM GRANULOCYTES # BLD: 0 10E3/UL
IMM GRANULOCYTES NFR BLD: 1 %
LYMPHOCYTES # BLD AUTO: 1.7 10E3/UL (ref 0.8–5.3)
LYMPHOCYTES NFR BLD AUTO: 39 %
MAGNESIUM SERPL-MCNC: 1.8 MG/DL (ref 1.7–2.3)
MCH RBC QN AUTO: 30.4 PG (ref 26.5–33)
MCHC RBC AUTO-ENTMCNC: 35.2 G/DL (ref 31.5–36.5)
MCV RBC AUTO: 86 FL (ref 78–100)
MONOCYTES # BLD AUTO: 0.3 10E3/UL (ref 0–1.3)
MONOCYTES NFR BLD AUTO: 7 %
NEUTROPHILS # BLD AUTO: 2.3 10E3/UL (ref 1.6–8.3)
NEUTROPHILS NFR BLD AUTO: 51 %
NEUTS HYPERSEG BLD QL SMEAR: NORMAL
NRBC # BLD AUTO: 0 10E3/UL
NRBC BLD AUTO-RTO: 0 /100
PHOSPHATE SERPL-MCNC: 1.8 MG/DL (ref 2.5–4.5)
PLAT MORPH BLD: NORMAL
PLATELET # BLD AUTO: 189 10E3/UL (ref 150–450)
POLYCHROMASIA BLD QL SMEAR: NORMAL
POTASSIUM SERPL-SCNC: 3.2 MMOL/L (ref 3.4–5.3)
POTASSIUM SERPL-SCNC: 3.2 MMOL/L (ref 3.4–5.3)
RBC # BLD AUTO: 3.69 10E6/UL (ref 3.8–5.2)
RBC AGGLUT BLD QL: NORMAL
RBC MORPH BLD: NORMAL
ROULEAUX BLD QL SMEAR: NORMAL
SICKLE CELLS BLD QL SMEAR: NORMAL
SMUDGE CELLS BLD QL SMEAR: NORMAL
SODIUM SERPL-SCNC: 137 MMOL/L (ref 135–145)
SPHEROCYTES BLD QL SMEAR: NORMAL
STOMATOCYTES BLD QL SMEAR: NORMAL
TARGETS BLD QL SMEAR: NORMAL
TOXIC GRANULES BLD QL SMEAR: NORMAL
VARIANT LYMPHS BLD QL SMEAR: NORMAL
WBC # BLD AUTO: 4.4 10E3/UL (ref 4–11)

## 2024-04-06 PROCEDURE — 258N000003 HC RX IP 258 OP 636: Performed by: INTERNAL MEDICINE

## 2024-04-06 PROCEDURE — 96375 TX/PRO/DX INJ NEW DRUG ADDON: CPT

## 2024-04-06 PROCEDURE — 96361 HYDRATE IV INFUSION ADD-ON: CPT

## 2024-04-06 PROCEDURE — 84132 ASSAY OF SERUM POTASSIUM: CPT

## 2024-04-06 PROCEDURE — 99239 HOSP IP/OBS DSCHRG MGMT >30: CPT | Performed by: INTERNAL MEDICINE

## 2024-04-06 PROCEDURE — 250N000013 HC RX MED GY IP 250 OP 250 PS 637

## 2024-04-06 PROCEDURE — 80048 BASIC METABOLIC PNL TOTAL CA: CPT | Performed by: INTERNAL MEDICINE

## 2024-04-06 PROCEDURE — 83735 ASSAY OF MAGNESIUM: CPT | Performed by: INTERNAL MEDICINE

## 2024-04-06 PROCEDURE — 36415 COLL VENOUS BLD VENIPUNCTURE: CPT | Performed by: INTERNAL MEDICINE

## 2024-04-06 PROCEDURE — 84100 ASSAY OF PHOSPHORUS: CPT

## 2024-04-06 PROCEDURE — 36415 COLL VENOUS BLD VENIPUNCTURE: CPT

## 2024-04-06 PROCEDURE — 85025 COMPLETE CBC W/AUTO DIFF WBC: CPT | Performed by: INTERNAL MEDICINE

## 2024-04-06 PROCEDURE — 250N000011 HC RX IP 250 OP 636: Performed by: INTERNAL MEDICINE

## 2024-04-06 RX ORDER — POTASSIUM CHLORIDE 1500 MG/1
40 TABLET, EXTENDED RELEASE ORAL ONCE
Status: COMPLETED | OUTPATIENT
Start: 2024-04-06 | End: 2024-04-06

## 2024-04-06 RX ORDER — POTASSIUM CHLORIDE 1500 MG/1
20 TABLET, EXTENDED RELEASE ORAL DAILY
Qty: 3 TABLET | Refills: 0 | Status: SHIPPED | OUTPATIENT
Start: 2024-04-06

## 2024-04-06 RX ADMIN — SODIUM CHLORIDE, POTASSIUM CHLORIDE, SODIUM LACTATE AND CALCIUM CHLORIDE: 600; 310; 30; 20 INJECTION, SOLUTION INTRAVENOUS at 04:54

## 2024-04-06 RX ADMIN — ONDANSETRON 4 MG: 4 TABLET, ORALLY DISINTEGRATING ORAL at 13:51

## 2024-04-06 RX ADMIN — POTASSIUM CHLORIDE 40 MEQ: 1500 TABLET, EXTENDED RELEASE ORAL at 10:29

## 2024-04-06 RX ADMIN — THIAMINE HYDROCHLORIDE 100 MG: 100 INJECTION, SOLUTION INTRAMUSCULAR; INTRAVENOUS at 09:03

## 2024-04-06 ASSESSMENT — ACTIVITIES OF DAILY LIVING (ADL)
ADLS_ACUITY_SCORE: 21

## 2024-04-06 NOTE — UTILIZATION REVIEW
"Admission Status; Secondary Review Determination     Admission Date: 4/5/2024  5:18 AM       Under the authority of the Utilization Management Committee, the utilization review process indicated a secondary review on the above patient.  The review outcome is based on review of the medical records, discussions with staff, and applying clinical experience noted on the date of the review.          (x) Observation Status Appropriate - This patient does not meet hospital inpatient criteria and is placed in observation status. If this patient's primary payer is Medicare and was admitted as an inpatient, Condition Code 44 should be used and patient status changed to \"observation\".       RATIONALE FOR DETERMINATION      Brief clinical presentation, information copied from the chart, abbreviated and edited for relevant content:     Patient admitted inpatient but discharging after one night. Messaged team to change to OBS before discharge.     Patient is a 33F with hx of alcohol abuse presented with nausea, vomiting, and abdominal pain in the setting of alcohol abuse and found to have acute renal failure and electrolyte disturbances. Improved with supportive care including IVF. Multiple past ED visits and admissions under similar circumstances. Counseled on alcohol cessation. Recommend PCP follow-up.         The severity of illness, intensity of cares provided, risk for adverse outcome, and expected LOS make the care appropriate for observation.       The information on this document is developed by the utilization review team in order for the business office to ensure compliance.  This only denotes the appropriateness of proper admission status and does not reflect the quality of care rendered.         The definitions of Inpatient Status and Observation Status used in making the determination above are those provided in the CMS Coverage Manual, Chapter 1 and Chapter 6, section 70.4.      Sincerely,     Yelena Ortiz MD "   Utilization Review/ Case Management  [facility].

## 2024-04-06 NOTE — PLAN OF CARE
"  Problem: Adult Inpatient Plan of Care  Goal: Plan of Care Review  Description: The Plan of Care Review/Shift note should be completed every shift.  The Outcome Evaluation is a brief statement about your assessment that the patient is improving, declining, or no change.  This information will be displayed automatically on your shift  note.  Outcome: Met  Flowsheets (Taken 4/6/2024 2713)  Outcome Evaluation: pt discharged to home . Discharge education reviewed with pt. Will fill script for Potassium tabs at [facility].All personal belongings taken by pt. Left unit via wheelchair accompanied by NA and family.  Plan of Care Reviewed With: patient  Overall Patient Progress: improving  Goal: Patient-Specific Goal (Individualized)  Description: You can add care plan individualizations to a care plan. Examples of Individualization might be:  \"Parent requests to be called daily at 9am for status\", \"I have a hard time hearing out of my right ear\", or \"Do not touch me to wake me up as it startles  me\".  Outcome: Met  Goal: Absence of Hospital-Acquired Illness or Injury  Outcome: Met  Goal: Optimal Comfort and Wellbeing  Outcome: Met  Goal: Readiness for Transition of Care  Outcome: Met     Problem: Skin Injury Risk Increased  Goal: Skin Health and Integrity  Outcome: Met     Problem: Acute Kidney Injury/Impairment  Goal: Fluid and Electrolyte Balance  Outcome: Met  Goal: Improved Oral Intake  Outcome: Met  Goal: Effective Renal Function  Outcome: Met     Problem: Alcohol Withdrawal  Goal: Alcohol Withdrawal Symptom Control  Outcome: Met  Goal: Optimal Neurologic Function  Outcome: Met  Goal: Readiness for Change Identified  Outcome: Met   Goal Outcome Evaluation:      Plan of Care Reviewed With: patient    Overall Patient Progress: improvingOverall Patient Progress: improving    Outcome Evaluation: pt discharged to home . Discharge education reviewed with pt. Will fill script for Potassium tabs at [facility].All personal belongings taken by pt. Left unit via wheelchair accompanied by NA and family.

## 2024-04-06 NOTE — DISCHARGE SUMMARY
"[facility]  Discharge Summary    Admit date:  4/5/2024    Discharge date and time: 4/6/2024    Discharge Physician: Micheal Caro MD    Primary care provider: No Ref-Primary, Physician    Primary Discharge Diagnosis      Alcoholic Ketoacidosis    Secondary Diagnoses     Alcoholism  Acute Renal Failure  Hypokalemia  Hypophosphatemia  Hyponatremia    Summary of Hospital Stay     33F with hx of alcohol abuse presented with nausea, vomiting, and abdominal pain in the setting of alcohol abuse and found to have acute renal failure and electrolyte disturbances. Improved with supportive care for this. Multiple past ED visits and admissions under similar circumstances. Counseled on alcohol cessation. Recommend PCP follow-up.    Patient Discharge Condition & Exam     Discharge condition: Improved    /75 (BP Location: Right arm, Patient Position: Supine, Cuff Size: Adult Regular)   Pulse 72   Temp 97.1  F (36.2  C) (Temporal)   Resp 16   Ht 1.626 m (5' 4\")   Wt 60.9 kg (134 lb 4.2 oz)   SpO2 100%   BMI 23.05 kg/m       General: In NAD.  Cardiac: RRR.  Lungs: CTAB.  Abd: Non-tender.  Ext: No edema.    Discharge Instructions     Patient/family instructions: Written discharge instruction given to patient/family    Discharge Medications:     Review of your medicines      You have not been prescribed any medications.        Discharge diet: regular diet    Discharge activity: Activity as tolerated    Discharge follow-up:    Follow up with primary care provider within one week or earlier if symptoms return or gets worse.    Follow up with consultant as instructed.    Pending Results     Unresulted Labs Ordered in the Past 30 Days of this Admission       No orders found for last 31 day(s).             Patient Allergies   No Known Allergies    Disposition   Disposition: home     I saw and evaluated the patient on day of discharge and  discharge instructions reviewed  and  all the patient's questions and " concerns addressed. Over 30 minutes spent on discharge and coordination of discharge process for this patient.

## 2024-04-06 NOTE — PLAN OF CARE
Goal Outcome Evaluation:      Plan of Care Reviewed With: patient    Overall Patient Progress: improvingOverall Patient Progress: improving    Outcome Evaluation: Patient VSS, tele monitoring, nausea better during shift, tolerating sips of clear liquids, no complaints of pain. Voided. CIWA score was 2 and 1. Patient A+O X4. K+ 3.4 oral replacement given. Recheck level at 2335.       Problem: Adult Inpatient Plan of Care  Goal: Plan of Care Review  Outcome: Progressing  Flowsheets (Taken 4/5/2024 2252)  Outcome Evaluation: Patient VSS, tele monitoring, nausea better during shift, tolerating sips of clear liquids, no complaints of pain. Voided. CIWA score was 2 and 1. Patient A+O X4  Plan of Care Reviewed With: patient  Overall Patient Progress: improving  Goal: Patient-Specific Goal (Individualized)  Outcome: Progressing  Goal: Absence of Hospital-Acquired Illness or Injury  Outcome: Progressing  Intervention: Identify and Manage Fall Risk  Recent Flowsheet Documentation  Taken 4/5/2024 1939 by Dorene Oliveira RN  Safety Promotion/Fall Prevention:   activity supervised   assistive device/personal items within reach   room near nurse's station   safety round/check completed  Intervention: Prevent Skin Injury  Recent Flowsheet Documentation  Taken 4/5/2024 1939 by Dorene Oliveira RN  Body Position: position changed independently  Device Skin Pressure Protection: adhesive use limited  Intervention: Prevent and Manage VTE (Venous Thromboembolism) Risk  Recent Flowsheet Documentation  Taken 4/5/2024 1939 by Dorene Oliveira RN  VTE Prevention/Management: SCDs (sequential compression devices) off  Intervention: Prevent Infection  Recent Flowsheet Documentation  Taken 4/5/2024 1939 by Dorene Oliveira RN  Infection Prevention:   rest/sleep promoted   single patient room provided   hand hygiene promoted  Goal: Optimal Comfort and Wellbeing  Outcome: Progressing  Goal: Readiness for Transition of Care  Outcome:  Progressing     Problem: Skin Injury Risk Increased  Goal: Skin Health and Integrity  Outcome: Progressing  Intervention: Optimize Skin Protection  Recent Flowsheet Documentation  Taken 4/5/2024 1939 by Dorene Oliveira RN  Activity Management: activity adjusted per tolerance     Problem: Acute Kidney Injury/Impairment  Goal: Fluid and Electrolyte Balance  Outcome: Progressing  Intervention: Monitor and Manage Fluid Electrolyte Balance  Recent Flowsheet Documentation  Taken 4/5/2024 1939 by Dorene Oliveira, RN  Fluid/Electrolyte Management: electrolyte supplement initiated  Goal: Improved Oral Intake  Outcome: Progressing  Goal: Effective Renal Function  Outcome: Progressing  Intervention: Monitor and Support Renal Function  Recent Flowsheet Documentation  Taken 4/5/2024 1939 by Dorene Oliveira, RN  Medication Review/Management: medications reviewed     Problem: Alcohol Withdrawal  Goal: Alcohol Withdrawal Symptom Control  Outcome: Progressing  Goal: Optimal Neurologic Function  Outcome: Progressing  Goal: Readiness for Change Identified  Outcome: Progressing  Intervention: Partner to Facilitate Behavior Change  Recent Flowsheet Documentation  Taken 4/5/2024 1939 by Dorene Oliveira RN  Supportive Measures:   self-reflection promoted   relaxation techniques promoted   problem-solving facilitated

## 2024-04-06 NOTE — PROGRESS NOTES
"Alert and oriented x 4, denies pain, no chest pain or chest pressure. No c/o SOB. Reports nausea shortly after taking oral potasium. PRN Zofran given. Diet advanced to regular diet. Slowly tolerating diet. Patient is discharged, awaiting for family to provide transportation.     Vital signs:  Temp: 97.1  F (36.2  C) Temp src: Temporal BP: 123/75 Pulse: 72   Resp: 16 SpO2: 100 % O2 Device: None (Room air)   Height: 162.6 cm (5' 4\") Weight: 60.9 kg (134 lb 4.2 oz)  Estimated body mass index is 23.05 kg/m  as calculated from the following:    Height as of this encounter: 1.626 m (5' 4\").    Weight as of this encounter: 60.9 kg (134 lb 4.2 oz).      Amalia Garces RN  on 4/6/2024 at 3:43 PM    "

## 2024-04-06 NOTE — PLAN OF CARE
"Goal Outcome Evaluation:      Plan of Care Reviewed With: patient    Overall Patient Progress: improving    Outcome Evaluation: DC to home TBD    CIWA of 0. No acute events overnight.       Problem: Adult Inpatient Plan of Care  Goal: Plan of Care Review  Description: The Plan of Care Review/Shift note should be completed every shift.  The Outcome Evaluation is a brief statement about your assessment that the patient is improving, declining, or no change.  This information will be displayed automatically on your shift  note.  Outcome: Progressing  Flowsheets (Taken 4/6/2024 7917)  Outcome Evaluation: DC to home TBD  Plan of Care Reviewed With: patient  Overall Patient Progress: improving  Goal: Patient-Specific Goal (Individualized)  Description: You can add care plan individualizations to a care plan. Examples of Individualization might be:  \"Parent requests to be called daily at 9am for status\", \"I have a hard time hearing out of my right ear\", or \"Do not touch me to wake me up as it startles  me\".  Outcome: Progressing  Goal: Absence of Hospital-Acquired Illness or Injury  Outcome: Progressing  Intervention: Identify and Manage Fall Risk  Recent Flowsheet Documentation  Taken 4/5/2024 2351 by Suzie Dey RN  Safety Promotion/Fall Prevention:   clutter free environment maintained   safety round/check completed  Intervention: Prevent and Manage VTE (Venous Thromboembolism) Risk  Recent Flowsheet Documentation  Taken 4/5/2024 2351 by Suzie Dey RN  VTE Prevention/Management: SCDs (sequential compression devices) off  Intervention: Prevent Infection  Recent Flowsheet Documentation  Taken 4/5/2024 2351 by Suzie Dey RN  Infection Prevention:   rest/sleep promoted   single patient room provided   hand hygiene promoted  Goal: Optimal Comfort and Wellbeing  Outcome: Progressing  Goal: Readiness for Transition of Care  Outcome: Progressing         "

## 2025-01-09 ENCOUNTER — HOSPITAL ENCOUNTER (INPATIENT)
Facility: CLINIC | Age: 35
End: 2025-01-09
Attending: STUDENT IN AN ORGANIZED HEALTH CARE EDUCATION/TRAINING PROGRAM | Admitting: INTERNAL MEDICINE
Payer: COMMERCIAL

## 2025-01-09 VITALS
WEIGHT: 129.41 LBS | OXYGEN SATURATION: 99 % | RESPIRATION RATE: 14 BRPM | BODY MASS INDEX: 22.09 KG/M2 | TEMPERATURE: 97.3 F | HEART RATE: 85 BPM | HEIGHT: 64 IN | SYSTOLIC BLOOD PRESSURE: 130 MMHG | DIASTOLIC BLOOD PRESSURE: 64 MMHG

## 2025-01-09 DIAGNOSIS — K76.0 FATTY LIVER: ICD-10-CM

## 2025-01-09 DIAGNOSIS — E87.29 ALCOHOLIC KETOACIDOSIS: ICD-10-CM

## 2025-01-09 DIAGNOSIS — N17.9 AKI (ACUTE KIDNEY INJURY): ICD-10-CM

## 2025-01-09 DIAGNOSIS — F10.10 ALCOHOL ABUSE: Primary | ICD-10-CM

## 2025-01-09 LAB
ALBUMIN SERPL BCG-MCNC: 5.3 G/DL (ref 3.5–5.2)
ALP SERPL-CCNC: 136 U/L (ref 40–150)
ALT SERPL W P-5'-P-CCNC: 35 U/L (ref 0–50)
ANION GAP SERPL CALCULATED.3IONS-SCNC: 23 MMOL/L (ref 7–15)
ANION GAP SERPL CALCULATED.3IONS-SCNC: 43 MMOL/L (ref 7–15)
AST SERPL W P-5'-P-CCNC: 56 U/L (ref 0–45)
B-OH-BUTYR SERPL-SCNC: >9 MMOL/L
BASE EXCESS BLDV CALC-SCNC: -13 MMOL/L (ref -3–3)
BASOPHILS # BLD AUTO: 0 10E3/UL (ref 0–0.2)
BASOPHILS NFR BLD AUTO: 0 %
BILIRUB SERPL-MCNC: 0.3 MG/DL
BUN SERPL-MCNC: 17.9 MG/DL (ref 6–20)
BUN SERPL-MCNC: 23.2 MG/DL (ref 6–20)
CALCIUM SERPL-MCNC: 10.4 MG/DL (ref 8.8–10.4)
CALCIUM SERPL-MCNC: 8.5 MG/DL (ref 8.8–10.4)
CHLORIDE SERPL-SCNC: 85 MMOL/L (ref 98–107)
CHLORIDE SERPL-SCNC: 99 MMOL/L (ref 98–107)
CREAT SERPL-MCNC: 1.01 MG/DL (ref 0.51–0.95)
CREAT SERPL-MCNC: 1.66 MG/DL (ref 0.51–0.95)
EGFRCR SERPLBLD CKD-EPI 2021: 42 ML/MIN/1.73M2
EGFRCR SERPLBLD CKD-EPI 2021: 75 ML/MIN/1.73M2
EOSINOPHIL # BLD AUTO: 0 10E3/UL (ref 0–0.7)
EOSINOPHIL NFR BLD AUTO: 0 %
ERYTHROCYTE [DISTWIDTH] IN BLOOD BY AUTOMATED COUNT: 14.8 % (ref 10–15)
ETHANOL SERPL-MCNC: 0.06 G/DL
FLUAV RNA SPEC QL NAA+PROBE: NEGATIVE
FLUBV RNA RESP QL NAA+PROBE: NEGATIVE
GLUCOSE SERPL-MCNC: 130 MG/DL (ref 70–99)
GLUCOSE SERPL-MCNC: 137 MG/DL (ref 70–99)
HCO3 BLDV-SCNC: 14 MMOL/L (ref 21–28)
HCO3 SERPL-SCNC: 11 MMOL/L (ref 22–29)
HCO3 SERPL-SCNC: 16 MMOL/L (ref 22–29)
HCT VFR BLD AUTO: 44.1 % (ref 35–47)
HGB BLD-MCNC: 14.9 G/DL (ref 11.7–15.7)
HOLD SPECIMEN: NORMAL
HOLD SPECIMEN: NORMAL
IMM GRANULOCYTES # BLD: 0 10E3/UL
IMM GRANULOCYTES NFR BLD: 0 %
LIPASE SERPL-CCNC: 24 U/L (ref 13–60)
LYMPHOCYTES # BLD AUTO: 1 10E3/UL (ref 0.8–5.3)
LYMPHOCYTES NFR BLD AUTO: 12 %
MAGNESIUM SERPL-MCNC: 2 MG/DL (ref 1.7–2.3)
MCH RBC QN AUTO: 29.7 PG (ref 26.5–33)
MCHC RBC AUTO-ENTMCNC: 33.8 G/DL (ref 31.5–36.5)
MCV RBC AUTO: 88 FL (ref 78–100)
MONOCYTES # BLD AUTO: 0.4 10E3/UL (ref 0–1.3)
MONOCYTES NFR BLD AUTO: 5 %
NEUTROPHILS # BLD AUTO: 6.7 10E3/UL (ref 1.6–8.3)
NEUTROPHILS NFR BLD AUTO: 83 %
NRBC # BLD AUTO: 0 10E3/UL
NRBC BLD AUTO-RTO: 0 /100
O2/TOTAL GAS SETTING VFR VENT: 0 %
OXYHGB MFR BLDV: 56 % (ref 70–75)
PCO2 BLDV: 33 MM HG (ref 40–50)
PH BLDV: 7.22 [PH] (ref 7.32–7.43)
PLATELET # BLD AUTO: 382 10E3/UL (ref 150–450)
PO2 BLDV: 37 MM HG (ref 25–47)
POTASSIUM SERPL-SCNC: 4.2 MMOL/L (ref 3.4–5.3)
POTASSIUM SERPL-SCNC: 4.2 MMOL/L (ref 3.4–5.3)
PROT SERPL-MCNC: 10 G/DL (ref 6.4–8.3)
RBC # BLD AUTO: 5.01 10E6/UL (ref 3.8–5.2)
RSV RNA SPEC NAA+PROBE: NEGATIVE
SAO2 % BLDV: 56.8 % (ref 70–75)
SARS-COV-2 RNA RESP QL NAA+PROBE: NEGATIVE
SODIUM SERPL-SCNC: 138 MMOL/L (ref 135–145)
SODIUM SERPL-SCNC: 139 MMOL/L (ref 135–145)
WBC # BLD AUTO: 8.1 10E3/UL (ref 4–11)

## 2025-01-09 PROCEDURE — 250N000011 HC RX IP 250 OP 636: Performed by: EMERGENCY MEDICINE

## 2025-01-09 PROCEDURE — 36415 COLL VENOUS BLD VENIPUNCTURE: CPT | Performed by: STUDENT IN AN ORGANIZED HEALTH CARE EDUCATION/TRAINING PROGRAM

## 2025-01-09 PROCEDURE — 82310 ASSAY OF CALCIUM: CPT | Performed by: PHYSICIAN ASSISTANT

## 2025-01-09 PROCEDURE — 250N000013 HC RX MED GY IP 250 OP 250 PS 637: Performed by: PHYSICIAN ASSISTANT

## 2025-01-09 PROCEDURE — 36415 COLL VENOUS BLD VENIPUNCTURE: CPT | Performed by: PHYSICIAN ASSISTANT

## 2025-01-09 PROCEDURE — 250N000011 HC RX IP 250 OP 636: Performed by: PHYSICIAN ASSISTANT

## 2025-01-09 PROCEDURE — 96365 THER/PROPH/DIAG IV INF INIT: CPT

## 2025-01-09 PROCEDURE — 85041 AUTOMATED RBC COUNT: CPT | Performed by: STUDENT IN AN ORGANIZED HEALTH CARE EDUCATION/TRAINING PROGRAM

## 2025-01-09 PROCEDURE — 87637 SARSCOV2&INF A&B&RSV AMP PRB: CPT | Performed by: STUDENT IN AN ORGANIZED HEALTH CARE EDUCATION/TRAINING PROGRAM

## 2025-01-09 PROCEDURE — 96361 HYDRATE IV INFUSION ADD-ON: CPT

## 2025-01-09 PROCEDURE — 83690 ASSAY OF LIPASE: CPT | Performed by: STUDENT IN AN ORGANIZED HEALTH CARE EDUCATION/TRAINING PROGRAM

## 2025-01-09 PROCEDURE — 96375 TX/PRO/DX INJ NEW DRUG ADDON: CPT

## 2025-01-09 PROCEDURE — 93005 ELECTROCARDIOGRAM TRACING: CPT

## 2025-01-09 PROCEDURE — 99222 1ST HOSP IP/OBS MODERATE 55: CPT | Performed by: PHYSICIAN ASSISTANT

## 2025-01-09 PROCEDURE — 258N000003 HC RX IP 258 OP 636: Performed by: PHYSICIAN ASSISTANT

## 2025-01-09 PROCEDURE — 83735 ASSAY OF MAGNESIUM: CPT | Performed by: STUDENT IN AN ORGANIZED HEALTH CARE EDUCATION/TRAINING PROGRAM

## 2025-01-09 PROCEDURE — 120N000001 HC R&B MED SURG/OB

## 2025-01-09 PROCEDURE — 82077 ASSAY SPEC XCP UR&BREATH IA: CPT | Performed by: INTERNAL MEDICINE

## 2025-01-09 PROCEDURE — 250N000011 HC RX IP 250 OP 636: Mod: JZ | Performed by: STUDENT IN AN ORGANIZED HEALTH CARE EDUCATION/TRAINING PROGRAM

## 2025-01-09 PROCEDURE — HZ2ZZZZ DETOXIFICATION SERVICES FOR SUBSTANCE ABUSE TREATMENT: ICD-10-PCS | Performed by: INTERNAL MEDICINE

## 2025-01-09 PROCEDURE — 258N000003 HC RX IP 258 OP 636: Performed by: STUDENT IN AN ORGANIZED HEALTH CARE EDUCATION/TRAINING PROGRAM

## 2025-01-09 PROCEDURE — 82805 BLOOD GASES W/O2 SATURATION: CPT | Performed by: STUDENT IN AN ORGANIZED HEALTH CARE EDUCATION/TRAINING PROGRAM

## 2025-01-09 PROCEDURE — 250N000009 HC RX 250: Performed by: INTERNAL MEDICINE

## 2025-01-09 PROCEDURE — 82010 KETONE BODYS QUAN: CPT | Performed by: STUDENT IN AN ORGANIZED HEALTH CARE EDUCATION/TRAINING PROGRAM

## 2025-01-09 PROCEDURE — 84295 ASSAY OF SERUM SODIUM: CPT | Performed by: STUDENT IN AN ORGANIZED HEALTH CARE EDUCATION/TRAINING PROGRAM

## 2025-01-09 PROCEDURE — 99291 CRITICAL CARE FIRST HOUR: CPT | Mod: 25

## 2025-01-09 PROCEDURE — 85004 AUTOMATED DIFF WBC COUNT: CPT | Performed by: STUDENT IN AN ORGANIZED HEALTH CARE EDUCATION/TRAINING PROGRAM

## 2025-01-09 RX ORDER — AMOXICILLIN 250 MG
2 CAPSULE ORAL 2 TIMES DAILY PRN
Status: DISCONTINUED | OUTPATIENT
Start: 2025-01-09 | End: 2025-01-12 | Stop reason: HOSPADM

## 2025-01-09 RX ORDER — LIDOCAINE 40 MG/G
CREAM TOPICAL
Status: DISCONTINUED | OUTPATIENT
Start: 2025-01-09 | End: 2025-01-12 | Stop reason: HOSPADM

## 2025-01-09 RX ORDER — ONDANSETRON 2 MG/ML
4 INJECTION INTRAMUSCULAR; INTRAVENOUS ONCE
Status: COMPLETED | OUTPATIENT
Start: 2025-01-09 | End: 2025-01-09

## 2025-01-09 RX ORDER — FLUMAZENIL 0.1 MG/ML
0.2 INJECTION, SOLUTION INTRAVENOUS
Status: DISCONTINUED | OUTPATIENT
Start: 2025-01-09 | End: 2025-01-12 | Stop reason: HOSPADM

## 2025-01-09 RX ORDER — FLUMAZENIL 0.1 MG/ML
0.2 INJECTION, SOLUTION INTRAVENOUS
Status: DISCONTINUED | OUTPATIENT
Start: 2025-01-09 | End: 2025-01-09

## 2025-01-09 RX ORDER — MORPHINE SULFATE 4 MG/ML
4 INJECTION, SOLUTION INTRAMUSCULAR; INTRAVENOUS ONCE
Status: COMPLETED | OUTPATIENT
Start: 2025-01-09 | End: 2025-01-09

## 2025-01-09 RX ORDER — PROCHLORPERAZINE MALEATE 5 MG/1
10 TABLET ORAL EVERY 6 HOURS PRN
Status: DISCONTINUED | OUTPATIENT
Start: 2025-01-09 | End: 2025-01-12 | Stop reason: HOSPADM

## 2025-01-09 RX ORDER — CALCIUM CARBONATE 500 MG/1
1000 TABLET, CHEWABLE ORAL 4 TIMES DAILY PRN
Status: DISCONTINUED | OUTPATIENT
Start: 2025-01-09 | End: 2025-01-12 | Stop reason: HOSPADM

## 2025-01-09 RX ORDER — MULTIPLE VITAMINS W/ MINERALS TAB 9MG-400MCG
1 TAB ORAL DAILY
Status: DISCONTINUED | OUTPATIENT
Start: 2025-01-09 | End: 2025-01-12 | Stop reason: HOSPADM

## 2025-01-09 RX ORDER — ACETAMINOPHEN 325 MG/1
650 TABLET ORAL EVERY 4 HOURS PRN
Status: DISCONTINUED | OUTPATIENT
Start: 2025-01-09 | End: 2025-01-11

## 2025-01-09 RX ORDER — ONDANSETRON 2 MG/ML
4 INJECTION INTRAMUSCULAR; INTRAVENOUS EVERY 6 HOURS PRN
Status: DISCONTINUED | OUTPATIENT
Start: 2025-01-09 | End: 2025-01-12 | Stop reason: HOSPADM

## 2025-01-09 RX ORDER — DEXTROSE MONOHYDRATE AND SODIUM CHLORIDE 5; .9 G/100ML; G/100ML
INJECTION, SOLUTION INTRAVENOUS CONTINUOUS
Status: ACTIVE | OUTPATIENT
Start: 2025-01-09 | End: 2025-01-10

## 2025-01-09 RX ORDER — ONDANSETRON 4 MG/1
4 TABLET, ORALLY DISINTEGRATING ORAL EVERY 6 HOURS PRN
Status: DISCONTINUED | OUTPATIENT
Start: 2025-01-09 | End: 2025-01-12 | Stop reason: HOSPADM

## 2025-01-09 RX ORDER — DEXTROSE MONOHYDRATE AND SODIUM CHLORIDE 5; .9 G/100ML; G/100ML
INJECTION, SOLUTION INTRAVENOUS CONTINUOUS
Status: DISCONTINUED | OUTPATIENT
Start: 2025-01-09 | End: 2025-01-09

## 2025-01-09 RX ORDER — FOLIC ACID 1 MG/1
1 TABLET ORAL DAILY
Status: DISCONTINUED | OUTPATIENT
Start: 2025-01-09 | End: 2025-01-12 | Stop reason: HOSPADM

## 2025-01-09 RX ORDER — LORAZEPAM 1 MG/1
1-2 TABLET ORAL EVERY 30 MIN PRN
Status: DISCONTINUED | OUTPATIENT
Start: 2025-01-09 | End: 2025-01-12

## 2025-01-09 RX ORDER — THIAMINE HYDROCHLORIDE 100 MG/ML
100 INJECTION, SOLUTION INTRAMUSCULAR; INTRAVENOUS ONCE
Status: COMPLETED | OUTPATIENT
Start: 2025-01-09 | End: 2025-01-09

## 2025-01-09 RX ORDER — AMOXICILLIN 250 MG
1 CAPSULE ORAL 2 TIMES DAILY PRN
Status: DISCONTINUED | OUTPATIENT
Start: 2025-01-09 | End: 2025-01-12 | Stop reason: HOSPADM

## 2025-01-09 RX ORDER — ONDANSETRON 4 MG/1
4 TABLET, ORALLY DISINTEGRATING ORAL ONCE
Status: COMPLETED | OUTPATIENT
Start: 2025-01-09 | End: 2025-01-09

## 2025-01-09 RX ORDER — ACETAMINOPHEN 650 MG/1
650 SUPPOSITORY RECTAL EVERY 4 HOURS PRN
Status: DISCONTINUED | OUTPATIENT
Start: 2025-01-09 | End: 2025-01-11

## 2025-01-09 RX ORDER — LORAZEPAM 2 MG/ML
1-2 INJECTION INTRAMUSCULAR EVERY 30 MIN PRN
Status: DISCONTINUED | OUTPATIENT
Start: 2025-01-09 | End: 2025-01-12

## 2025-01-09 RX ADMIN — ONDANSETRON 4 MG: 2 INJECTION INTRAMUSCULAR; INTRAVENOUS at 11:51

## 2025-01-09 RX ADMIN — FOLIC ACID 1 MG: 1 TABLET ORAL at 21:41

## 2025-01-09 RX ADMIN — ONDANSETRON 4 MG: 4 TABLET, ORALLY DISINTEGRATING ORAL at 09:52

## 2025-01-09 RX ADMIN — ONDANSETRON 4 MG: 2 INJECTION INTRAMUSCULAR; INTRAVENOUS at 21:55

## 2025-01-09 RX ADMIN — ONDANSETRON 4 MG: 2 INJECTION INTRAMUSCULAR; INTRAVENOUS at 16:09

## 2025-01-09 RX ADMIN — MORPHINE SULFATE 4 MG: 4 INJECTION, SOLUTION INTRAMUSCULAR; INTRAVENOUS at 10:09

## 2025-01-09 RX ADMIN — DEXTROSE MONOHYDRATE AND SODIUM CHLORIDE: 5; .9 INJECTION, SOLUTION INTRAVENOUS at 11:51

## 2025-01-09 RX ADMIN — DEXTROSE AND SODIUM CHLORIDE: 5; 900 INJECTION, SOLUTION INTRAVENOUS at 16:14

## 2025-01-09 RX ADMIN — SODIUM CHLORIDE 1000 ML: 9 INJECTION, SOLUTION INTRAVENOUS at 10:10

## 2025-01-09 RX ADMIN — Medication 1 TABLET: at 21:41

## 2025-01-09 RX ADMIN — THIAMINE HYDROCHLORIDE 100 MG: 100 INJECTION, SOLUTION INTRAMUSCULAR; INTRAVENOUS at 14:33

## 2025-01-09 RX ADMIN — PANTOPRAZOLE SODIUM 40 MG: 40 INJECTION, POWDER, FOR SOLUTION INTRAVENOUS at 21:45

## 2025-01-09 ASSESSMENT — ACTIVITIES OF DAILY LIVING (ADL)
ADLS_ACUITY_SCORE: 55
ADLS_ACUITY_SCORE: 53
ADLS_ACUITY_SCORE: 29
ADLS_ACUITY_SCORE: 53
ADLS_ACUITY_SCORE: 29
ADLS_ACUITY_SCORE: 53
ADLS_ACUITY_SCORE: 53
ADLS_ACUITY_SCORE: 29
ADLS_ACUITY_SCORE: 29
ADLS_ACUITY_SCORE: 53
ADLS_ACUITY_SCORE: 29

## 2025-01-09 ASSESSMENT — COLUMBIA-SUICIDE SEVERITY RATING SCALE - C-SSRS
1. IN THE PAST MONTH, HAVE YOU WISHED YOU WERE DEAD OR WISHED YOU COULD GO TO SLEEP AND NOT WAKE UP?: NO
2. HAVE YOU ACTUALLY HAD ANY THOUGHTS OF KILLING YOURSELF IN THE PAST MONTH?: NO
6. HAVE YOU EVER DONE ANYTHING, STARTED TO DO ANYTHING, OR PREPARED TO DO ANYTHING TO END YOUR LIFE?: NO

## 2025-01-09 NOTE — ED PROVIDER NOTES
"  Emergency Department Note      History of Present Illness     Chief Complaint   Abdominal Pain      HPI   Karlo Fuentes is a 34 year old female with a past medical history significant for MILDRED, HTN, pancreatitis, and alcohol use disorder who presents to the emergency department for evaluation of abdominal pain that began earlier this morning. She has had excessive emesis and nausea since yesterday, and endorses some mild throat discomfort as well. She reports that she has been drinking for the past three to four days and has had very minimal food intake as well. Her last alcohol intake was at about 0500 am this morning, and reports drinking close to a pint of alcohol about three to four times per week. She has been having recurrent episodes of emesis since she woke up at 0500 this morning. Her abdominal pain also seems to radiate to her back bilaterally, but she denies any changes to bowel movements or urinary symptoms at this time. She denies any chronic medical problems besides HTN, illicit drug use, or previous abdominal surgeries.  She has not ingested any toxic alcohols.  Only ethanol.    Independent Historian   None    Review of External Notes   I reviewed the discharge summary note from 3/4/24, for which the patient was seen for alcohol use disorder and alcohol withdrawal.    Past Medical History   Medical History and Problem List   MILDRED  Alcohol use disorder  Hypokalemia  Hyponatremia  HTN  pancreatitis    Medications   phosphorus  potassium chloride   mirtazapine    Physical Exam     Patient Vitals for the past 24 hrs:   BP Temp Temp src Pulse Resp SpO2 Height Weight   01/09/25 0956 115/87 97.1  F (36.2  C) Oral 120 18 98 % 1.626 m (5' 4\") 58.7 kg (129 lb 6.6 oz)     Physical Exam  GENERAL: Patient uncomfortable, actively retching  HEAD: Atraumatic.  NECK: No rigidity  CV: Tachycardic and regular, no murmurs, rubs or gallops  PULM: CTAB with good aeration; no retractions, rales, rhonchi, or wheezing  ABD: " Soft, epigastric tenderness, nondistended, no guarding  DERM: No rash. Skin warm and dry  EXTREMITY: Moving all extremities without difficulty.      Diagnostics     Lab Results   Labs Ordered and Resulted from Time of ED Arrival to Time of ED Departure   COMPREHENSIVE METABOLIC PANEL - Abnormal       Result Value    Sodium 139      Potassium 4.2      Carbon Dioxide (CO2) 11 (*)     Anion Gap 43 (*)     Urea Nitrogen 23.2 (*)     Creatinine 1.66 (*)     GFR Estimate 42 (*)     Calcium 10.4      Chloride 85 (*)     Glucose 137 (*)     Alkaline Phosphatase 136      AST 56 (*)     ALT 35      Protein Total 10.0 (*)     Albumin 5.3 (*)     Bilirubin Total 0.3     BLOOD GAS VENOUS - Abnormal    pH Venous 7.22 (*)     pCO2 Venous 33 (*)     pO2 Venous 37      Bicarbonate Venous 14 (*)     Base Excess/Deficit Venous -13.0 (*)     FIO2 0      Oxyhemoglobin Venous 56 (*)     O2 Sat, Venous 56.8 (*)    LIPASE - Normal    Lipase 24     MAGNESIUM - Normal    Magnesium 2.0     INFLUENZA A/B, RSV AND SARS-COV2 PCR - Normal    Influenza A PCR Negative      Influenza B PCR Negative      RSV PCR Negative      SARS CoV2 PCR Negative     CBC WITH PLATELETS AND DIFFERENTIAL    WBC Count 8.1      RBC Count 5.01      Hemoglobin 14.9      Hematocrit 44.1      MCV 88      MCH 29.7      MCHC 33.8      RDW 14.8      Platelet Count 382      % Neutrophils 83      % Lymphocytes 12      % Monocytes 5      % Eosinophils 0      % Basophils 0      % Immature Granulocytes 0      NRBCs per 100 WBC 0      Absolute Neutrophils 6.7      Absolute Lymphocytes 1.0      Absolute Monocytes 0.4      Absolute Eosinophils 0.0      Absolute Basophils 0.0      Absolute Immature Granulocytes 0.0      Absolute NRBCs 0.0     KETONE BETA-HYDROXYBUTYRATE QUANTITATIVE, RAPID       Imaging   No orders to display       EKG   None    Independent Interpretation   None    ED Course      Medications Administered   Medications   dextrose 5% and 0.9% NaCl infusion ( Intravenous  $New Bag 1/9/25 1151)   ondansetron (ZOFRAN ODT) ODT tab 4 mg (4 mg Oral $Given 1/9/25 0952)   sodium chloride 0.9% BOLUS 1,000 mL (0 mLs Intravenous Stopped 1/9/25 1142)   morphine (PF) injection 4 mg (4 mg Intravenous $Given 1/9/25 1009)   ondansetron (ZOFRAN) injection 4 mg (4 mg Intravenous $Given 1/9/25 1151)       Procedures   Procedures     Discussion of Management   Admitting Hospitalist, Dr. Morris    ED Course   ED Course as of 01/09/25 1235   Thu Jan 09, 2025   1003 I obtained history and examined the patient as noted above.     1234 I spoke with Dr. Morris from [facility], who accepts the patient for admission.         Additional Documentation  Social Determinants of Health: Alcohol use disorder    Medical Decision Making / Diagnosis     CMS Diagnoses: None    MIPS       None    MDM   Karlo Fuentes is a 34 year old female     Symptoms most consistent with alcohol ketoacidosis.  Chronic conditions complicating - alcohol abuse  DDx considered gastroparesis, DKA, metabolic abnormality among others.  Labs significant for elevated ketones and anion gap   VBG showing pH 7.22.  Glucose is only 137.  Thus, do not think this is DKA.  No history of diabetes.  Given IVF, thiamine and then D5NS drip.  Pain was treated with morphine.  Nausea treated with Zofran.  She is still symptomatic but appears improved on repeat check.  Repeat abdominal exam is benign.  Do not think we require abdominal imaging.  Discussed with hospitalist    Patient admitted.       Disposition   The patient was admitted to the hospital.     Diagnosis     ICD-10-CM    1. Alcoholic ketoacidosis  E87.29       2. MILDRED (acute kidney injury)  N17.9            Discharge Medications   New Prescriptions    No medications on file         Scribe Disclosure:  I, Kary Schultz, am serving as a scribe at 12:12 PM on 1/9/2025 to document services personally performed by Jaylon Weiner MD based on my observations and the provider's  statements to me.        Jaylon Weiner MD  01/09/25 9481

## 2025-01-09 NOTE — ED NOTES
"[facility]  ED Nurse Handoff Report    ED Chief complaint: Abdominal Pain  . ED Diagnosis:   Final diagnoses:   Alcoholic ketoacidosis   MILDRED (acute kidney injury)       Allergies: No Known Allergies    Code Status: Full Code    Activity level - Baseline/Home:  independent.  Activity Level - Current:   standby.   Lift room needed: No.   Bariatric: No   Needed: No   Isolation: No.   Infection: Not Applicable.     Respiratory status: Room air    Vital Signs (within 30 minutes):   Vitals:    01/09/25 0956   BP: 115/87   Pulse: 120   Resp: 18   Temp: 97.1  F (36.2  C)   TempSrc: Oral   SpO2: 98%   Weight: 58.7 kg (129 lb 6.6 oz)   Height: 1.626 m (5' 4\")       Cardiac Rhythm:  ,      Pain level:    Patient confused: No.   Patient Falls Risk: bed/chair alarm on, patient and family education, and activity supervised.   Elimination Status: Has voided     Patient Report - Initial Complaint: Pt states she has been on a 3/4 day drinking griggs. Pt stopped drinking around 0500 this am. Has been vomiting since. In triage Pt has filled multiple blue puke bags. Pt also endorses generalized AB pain. \"Pt is vomiPt states she drinks \"about a pint 3/4 times a week.\"        .   Focused Assessment: pt expresses that she drinks 1 pint of vodka per day and vapes weed. Pt is retching and vomitting. IVF and Zofran given. Tolerating Ice chips. Weak on ambulation.     Abnormal Results:   Labs Ordered and Resulted from Time of ED Arrival to Time of ED Departure   COMPREHENSIVE METABOLIC PANEL - Abnormal       Result Value    Sodium 139      Potassium 4.2      Carbon Dioxide (CO2) 11 (*)     Anion Gap 43 (*)     Urea Nitrogen 23.2 (*)     Creatinine 1.66 (*)     GFR Estimate 42 (*)     Calcium 10.4      Chloride 85 (*)     Glucose 137 (*)     Alkaline Phosphatase 136      AST 56 (*)     ALT 35      Protein Total 10.0 (*)     Albumin 5.3 (*)     Bilirubin Total 0.3     BLOOD GAS VENOUS - Abnormal    pH Venous " 7.22 (*)     pCO2 Venous 33 (*)     pO2 Venous 37      Bicarbonate Venous 14 (*)     Base Excess/Deficit Venous -13.0 (*)     FIO2 0      Oxyhemoglobin Venous 56 (*)     O2 Sat, Venous 56.8 (*)    KETONE BETA-HYDROXYBUTYRATE QUANTITATIVE, RAPID - Abnormal    Ketone (Beta-Hydroxybutyrate) Quantitative >9.00 (*)    LIPASE - Normal    Lipase 24     MAGNESIUM - Normal    Magnesium 2.0     INFLUENZA A/B, RSV AND SARS-COV2 PCR - Normal    Influenza A PCR Negative      Influenza B PCR Negative      RSV PCR Negative      SARS CoV2 PCR Negative     CBC WITH PLATELETS AND DIFFERENTIAL    WBC Count 8.1      RBC Count 5.01      Hemoglobin 14.9      Hematocrit 44.1      MCV 88      MCH 29.7      MCHC 33.8      RDW 14.8      Platelet Count 382      % Neutrophils 83      % Lymphocytes 12      % Monocytes 5      % Eosinophils 0      % Basophils 0      % Immature Granulocytes 0      NRBCs per 100 WBC 0      Absolute Neutrophils 6.7      Absolute Lymphocytes 1.0      Absolute Monocytes 0.4      Absolute Eosinophils 0.0      Absolute Basophils 0.0      Absolute Immature Granulocytes 0.0      Absolute NRBCs 0.0          No orders to display       Treatments provided: IVF, Zofran  Family Comments: Family in room.  OBS brochure/video discussed/provided to patient:  No  ED Medications:   Medications   dextrose 5% and 0.9% NaCl infusion ( Intravenous $New Bag 1/9/25 1151)   thiamine (B-1) injection 100 mg (has no administration in time range)   ondansetron (ZOFRAN ODT) ODT tab 4 mg (4 mg Oral $Given 1/9/25 0915)   sodium chloride 0.9% BOLUS 1,000 mL (0 mLs Intravenous Stopped 1/9/25 1142)   morphine (PF) injection 4 mg (4 mg Intravenous $Given 1/9/25 1009)   ondansetron (ZOFRAN) injection 4 mg (4 mg Intravenous $Given 1/9/25 1151)       Drips infusing:  No  For the majority of the shift this patient was Green.   Interventions performed were NA.    Sepsis treatment initiated: No    Cares/treatment/interventions/medications to be completed  following ED care: NA    ED Nurse Name: Rosas Polk RN  1:51 PM RECEIVING UNIT ED HANDOFF REVIEW    Above ED Nurse Handoff Report was reviewed: Yes  Reviewed by: Sharon Ortiz RN on January 9, 2025 at 4:23 PM   I Henna called the ED to inform them the note was read: No

## 2025-01-09 NOTE — PLAN OF CARE
"Goal Outcome Evaluation:      Plan of Care Reviewed With: patient    Overall Patient Progress: no changeOverall Patient Progress: no change    Outcome Evaluation: A&OX4. Nausea and vomiting, PRN Zofran given. On IVF.      Problem: Adult Inpatient Plan of Care  Goal: Plan of Care Review  Description: The Plan of Care Review/Shift note should be completed every shift.  The Outcome Evaluation is a brief statement about your assessment that the patient is improving, declining, or no change.  This information will be displayed automatically on your shift  note.  Outcome: Progressing  Flowsheets (Taken 1/9/2025 0539)  Outcome Evaluation: A&OX4. Nausea and vomiting, PRN Zofran given. On IVF.  Plan of Care Reviewed With: patient  Overall Patient Progress: no change  Goal: Patient-Specific Goal (Individualized)  Description: You can add care plan individualizations to a care plan. Examples of Individualization might be:  \"Parent requests to be called daily at 9am for status\", \"I have a hard time hearing out of my right ear\", or \"Do not touch me to wake me up as it startles  me\".  Outcome: Progressing  Goal: Absence of Hospital-Acquired Illness or Injury  Outcome: Progressing  Goal: Optimal Comfort and Wellbeing  Outcome: Progressing  Goal: Readiness for Transition of Care  Outcome: Progressing     Problem: Alcohol Withdrawal  Goal: Alcohol Withdrawal Symptom Control  Outcome: Progressing  Goal: Optimal Neurologic Function  Outcome: Progressing  Goal: Readiness for Change Identified  Outcome: Progressing       "

## 2025-01-09 NOTE — H&P
[facility]    Hospitalist History and Physical    Name: Karlo Fuentes    MRN: [id number]  YOB: 1990    Age: 34 year old  Date of Admission:  1/9/2025  Date of Service (when I saw the patient): 01/09/25    Assessment & Plan   Karlo Fuentes is a 34 year old female with PMH significant for alcohol abuse, alcoholic pancreatitis, HTN, and marijuana use who presents to the ED on 1/9/2025 for evaluation of abdominal pain, nausea, and vomiting.    ED workup reveals: tachycardic otherwise VSS, CBC unremarkable, CO2 of 11, anion gap of 43, BUN of 23.2, creatinine of 1.66, chloride of 85, glucose of 137, AST of 56, protein of 10.0, albumin of 5.3, magnesium WNL, ketones > 9.0, influenza/RSV/COVID-negative, pH of 7.22, pCO2 of 33, HCO3 of 14, pO2 of 37, and no imaging performed in ED.    #Alcoholic ketoacidosis  #AGMA  #MILDRED  Due to recent increased alcohol use the last 4 days PTA with minimal fluid or food intake otherwise and with GI losses from vomiting for the last 2 days PTA. CO2 of 11, anion gap of 43, BUN of 23.2, creatinine of 1.66, chloride of 85, ketones >9.0, and VBG shows pH of 7.22 with CO2 of 33 and bicarbonate of 14. Received 1 liter NS bolus in ED and started on D5 + NS.  -continue D5 + NS at 100 ml/hour overnight  -start with CLD and ADAT  -recheck BMP this evening to ensure anion gap is improving     #Alcohol use disorder  #H/o alcohol withdrawal   Recently drinking about 1 pint of vodka per day for the last 4 days PTA. Reports increased alcohol use to social stressors. Last drink around 5 AM on 1/9. Previously gone through withdrawal where patient reports associated tremors with this but denies history of withdrawal seizures. AST of 56 otherwise remainder of LFTs WNL.  -check etoh level, add on previous labs  -does not currently appear in withdrawal  -CIWA and give symptoms triggered Ativan if needed, hold off on scheduled Gabapentin at this time  -CD consult, would be  interested in resources to assist with alcohol cessation     #Tachycardia  -sounds regular on exam, no history of arrhythmia  -check EKG since not completed in ED    #HTN  Reports history of elevated BP but not on medical management. BP currently stable.   -continue to monitor     Clinically Significant Risk Factors Present on Admission          # Hypochloremia: Lowest Cl = 85 mmol/L in last 2 days, will monitor as appropriate     # Anion Gap Metabolic Acidosis: Highest Anion Gap = 43 mmol/L in last 2 days, will monitor and treat as appropriate      # Hypertension: Noted on problem list                   DVT Prophylaxis: Ambulate every shift  Code Status: Full Code, discussed with patient   Disposition: Expected stay >2 midnights, will admit to inpatient. Medically Ready for Discharge: Anticipated in 2-4 Days    Primary Care Physician   None    Chief Complaint   Abdominal pain, nausea, vomiting    History obtained from discussion with ED provider, Dr. Weiner, chart review, and interview with patient.     History of Present Illness   Karlo Fuentes is a 34 year old female who presents with abdominal pain, nausea, and vomiting.  Patient states that she has been drinking about 1 pint of vodka per day since 1/4.  Her last drink was around 5 AM on 1/9.  Since onset states she has been having ongoing nausea with multiple episodes of nonbloody emesis and eventually was just vomiting bile this morning due to lack of intake.  She has not had anything to eat for the last 2 days.  She reports epigastric abdominal pain with vomiting and coughing but otherwise does not have abdominal pain.  She felt tremulous when she initially presented to the ER with associated chills but this has improved with IV fluids.  She also notes lightheadedness and dizziness when she came into the emergency room that has now resolved.  Denies chest pain, shortness of breath, headache, hallucinations, or lower extremity swelling. She is unsure if she  feels anxious.  She has been voiding without difficulty. She has not previously gone to treatment for alcohol but would be interested in resources to consider this.  She reports using vape pens for nicotine and intermittent marijuana use.  She reports a history of alcohol drawl but has not previously had any seizures related to this.    Past Medical History    HTN  Alcohol abuse  Alcoholic pancreatitis  Alcohol withdrawal    Past Surgical History   Surgical history reviewed with patient and noncontributory.     Prior to Admission Medications   None     Allergies   No Known Allergies    Social History   Social History     Tobacco Use    Smoking status: Not on file    Smokeless tobacco: Not on file   Substance Use Topics    Alcohol use: Not on file     Social History     Social History Narrative    Not on file     Family History   Family history reviewed with patient and is noncontributory.    Review of Systems   A Comprehensive greater than 10 system review of systems was carried out.  Pertinent positives and negatives are noted above.  Otherwise negative for contributory information.    Physical Exam   Temp: 97.1  F (36.2  C) Temp src: Oral BP: 115/87 Pulse: 120   Resp: 18 SpO2: 98 % O2 Device: None (Room air)    Vital Signs with Ranges  Temp:  [97.1  F (36.2  C)] 97.1  F (36.2  C)  Pulse:  [120] 120  Resp:  [18] 18  BP: (115)/(87) 115/87  SpO2:  [98 %] 98 %  129 lbs 6.56 oz    GEN:  Alert, oriented x 3, appears comfortable laying on gurney, no overt distress  HEENT:  Normocephalic/atraumatic, no scleral icterus, no nasal discharge, mouth moist.  CV:  Regular rate and rhythm, no murmur or JVD.  S1 + S2 noted, no S3 or S4.  LUNGS:  Clear to auscultation bilaterally without rales/rhonchi/wheezing/retractions.  Symmetric chest rise on inhalation noted.  ABD:  Active bowel sounds, soft, non-tender/non-distended.  No rebound/guarding/rigidity.  EXT:  No LE edema.  No cyanosis.  No acute joint synovitis noted.  SKIN:  Dry  to touch, no exanthems noted in the visualized areas.  NEURO:  Symmetric muscle strength, sensation to touch grossly intact.  Coordination symmetric on general exam. No new focal deficits appreciated. Non-tremulous.     Data   Data reviewed today:  I personally reviewed labs and imaging from this visit.     Results for orders placed or performed during the hospital encounter of 01/09/25   Comprehensive metabolic panel     Status: Abnormal   Result Value Ref Range    Sodium 139 135 - 145 mmol/L    Potassium 4.2 3.4 - 5.3 mmol/L    Carbon Dioxide (CO2) 11 (L) 22 - 29 mmol/L    Anion Gap 43 (H) 7 - 15 mmol/L    Urea Nitrogen 23.2 (H) 6.0 - 20.0 mg/dL    Creatinine 1.66 (H) 0.51 - 0.95 mg/dL    GFR Estimate 42 (L) >60 mL/min/1.73m2    Calcium 10.4 8.8 - 10.4 mg/dL    Chloride 85 (L) 98 - 107 mmol/L    Glucose 137 (H) 70 - 99 mg/dL    Alkaline Phosphatase 136 40 - 150 U/L    AST 56 (H) 0 - 45 U/L    ALT 35 0 - 50 U/L    Protein Total 10.0 (H) 6.4 - 8.3 g/dL    Albumin 5.3 (H) 3.5 - 5.2 g/dL    Bilirubin Total 0.3 <=1.2 mg/dL   Lipase     Status: Normal   Result Value Ref Range    Lipase 24 13 - 60 U/L   Magnesium     Status: Normal   Result Value Ref Range    Magnesium 2.0 1.7 - 2.3 mg/dL   Bergoo Draw     Status: None    Narrative    The following orders were created for panel order [facility] Draw.  Procedure                               Abnormality         Status                     ---------                               -----------         ------                     Extra Blue Top Tube[[id number]]                              Final result               Extra Red Top Tube[[id number]]                               Final result                 Please view results for these tests on the individual orders.   CBC with platelets and differential     Status: None   Result Value Ref Range    WBC Count 8.1 4.0 - 11.0 10e3/uL    RBC Count 5.01 3.80 - 5.20 10e6/uL    Hemoglobin 14.9 11.7 - 15.7 g/dL    Hematocrit 44.1 35.0 - 47.0  %    MCV 88 78 - 100 fL    MCH 29.7 26.5 - 33.0 pg    MCHC 33.8 31.5 - 36.5 g/dL    RDW 14.8 10.0 - 15.0 %    Platelet Count 382 150 - 450 10e3/uL    % Neutrophils 83 %    % Lymphocytes 12 %    % Monocytes 5 %    % Eosinophils 0 %    % Basophils 0 %    % Immature Granulocytes 0 %    NRBCs per 100 WBC 0 <1 /100    Absolute Neutrophils 6.7 1.6 - 8.3 10e3/uL    Absolute Lymphocytes 1.0 0.8 - 5.3 10e3/uL    Absolute Monocytes 0.4 0.0 - 1.3 10e3/uL    Absolute Eosinophils 0.0 0.0 - 0.7 10e3/uL    Absolute Basophils 0.0 0.0 - 0.2 10e3/uL    Absolute Immature Granulocytes 0.0 <=0.4 10e3/uL    Absolute NRBCs 0.0 10e3/uL   Extra Blue Top Tube     Status: None   Result Value Ref Range    Hold Specimen JIC    Extra Red Top Tube     Status: None   Result Value Ref Range    Hold Specimen JIC    Influenza A/B, RSV and SARS-CoV2 PCR (COVID-19) Nasopharyngeal     Status: Normal    Specimen: Nasopharyngeal; Swab   Result Value Ref Range    Influenza A PCR Negative Negative    Influenza B PCR Negative Negative    RSV PCR Negative Negative    SARS CoV2 PCR Negative Negative    Narrative    Testing was performed using the Xpert Xpress CoV2/Flu/RSV Assay on the [employer] GeneXpert Instrument. This test should be ordered for the detection of SARS-CoV2, influenza, and RSV viruses in individuals with signs and symptoms of respiratory tract infection. This test is for in vitro diagnostic use under the [place] FDA for laboratories certified under CLIA to perform high or moderate complexity testing. This test has been [place] FDA cleared. A negative result does not rule out the presence of PCR inhibitors in the specimen or target RNA in concentration below the limit of detection for the assay. If only one viral target is positive but coinfection with multiple targets is suspected, the sample should be re-tested with another FDA cleared, approved, or authorized test, if coninfection would change clinical management. This test was validated by the  [facility]. These laboratories are certified under the Clinical Laboratory Improvement Amendments of 1988 (CLIA-88) as qualified to perfom high complexity laboratory testing.   Blood gas venous     Status: Abnormal   Result Value Ref Range    pH Venous 7.22 (L) 7.32 - 7.43    pCO2 Venous 33 (L) 40 - 50 mm Hg    pO2 Venous 37 25 - 47 mm Hg    Bicarbonate Venous 14 (L) 21 - 28 mmol/L    Base Excess/Deficit Venous -13.0 (L) -3.0 - 3.0 mmol/L    FIO2 0     Oxyhemoglobin Venous 56 (L) 70 - 75 %    O2 Sat, Venous 56.8 (L) 70.0 - 75.0 %    Narrative    In healthy individuals, oxyhemoglobin (O2Hb) and oxygen saturation (SO2) are approximately equal. In the presence of dyshemoglobins, oxyhemoglobin can be considerably lower than oxygen saturation.   Ketone Beta-Hydroxybutyrate Quantitative     Status: Abnormal   Result Value Ref Range    Ketone (Beta-Hydroxybutyrate) Quantitative >9.00 (HH) <=0.30 mmol/L   CBC with platelets differential     Status: None    Narrative    The following orders were created for panel order CBC with platelets differential.  Procedure                               Abnormality         Status                     ---------                               -----------         ------                     CBC with platelets and d...[[id number]]                      Final result                 Please view results for these tests on the individual orders.     SALVADOR [facility]  Securely message with the Vocera Web Console (learn more here)  Text page via Razz Paging/Directory  I discussed the patient with Dr. Toledo and he agrees with the above plan.

## 2025-01-09 NOTE — ED TRIAGE NOTES
"Pt states she has been on a 3/4 day drinking griggs. Pt stopped drinking around 0500 this am. Has been vomiting since. In triage Pt has filled multiple blue puke bags. Pt also endorses generalized AB pain.       Pt states she drinks \"about a pint 3/4 times a week.\"        "

## 2025-01-09 NOTE — PHARMACY-ADMISSION MEDICATION HISTORY
Pharmacist Admission Medication History    Admission medication history is complete. The information provided in this note is only as accurate as the sources available at the time of the update.    Information Source(s): Patient and CareEverywhere/SureScripts via in-person    Pertinent Information: None    Changes made to PTA medication list:  Added: None  Deleted: None  Changed: None    Allergies reviewed with patient and updates made in EHR: no    Medication History Completed By: Dalton Magaña RPH 1/9/2025 1:37 PM    No outpatient medications have been marked as taking for the 1/9/25 encounter (Hospital Encounter).

## 2025-01-09 NOTE — LETTER
[place] [address] [facility]  [address]  [facility] [address]  [phone]    Re: Karlo Fuentes  [address]   [facility] [address]  [phone] (home)     : 1990      To Whom It May Concern:      Karlo Fuentes was hospitalized from 2025 until 2025 due to medical illness.  She may return to work on 2025 with full duty.        Sincerely,        Timi Toledo, DO

## 2025-01-10 LAB
ALBUMIN SERPL BCG-MCNC: 4 G/DL (ref 3.5–5.2)
ALP SERPL-CCNC: 84 U/L (ref 40–150)
ALT SERPL W P-5'-P-CCNC: 27 U/L (ref 0–50)
ANION GAP SERPL CALCULATED.3IONS-SCNC: 14 MMOL/L (ref 7–15)
AST SERPL W P-5'-P-CCNC: 49 U/L (ref 0–45)
BILIRUB DIRECT SERPL-MCNC: <0.2 MG/DL (ref 0–0.3)
BILIRUB SERPL-MCNC: 0.6 MG/DL
BUN SERPL-MCNC: 13.2 MG/DL (ref 6–20)
CALCIUM SERPL-MCNC: 8.5 MG/DL (ref 8.8–10.4)
CHLORIDE SERPL-SCNC: 102 MMOL/L (ref 98–107)
CREAT SERPL-MCNC: 0.93 MG/DL (ref 0.51–0.95)
EGFRCR SERPLBLD CKD-EPI 2021: 83 ML/MIN/1.73M2
GLUCOSE SERPL-MCNC: 128 MG/DL (ref 70–99)
HCO3 SERPL-SCNC: 23 MMOL/L (ref 22–29)
MAGNESIUM SERPL-MCNC: 1.9 MG/DL (ref 1.7–2.3)
PHOSPHATE SERPL-MCNC: 0.6 MG/DL (ref 2.5–4.5)
PHOSPHATE SERPL-MCNC: 2.1 MG/DL (ref 2.5–4.5)
POTASSIUM SERPL-SCNC: 3.6 MMOL/L (ref 3.4–5.3)
PROT SERPL-MCNC: 6.9 G/DL (ref 6.4–8.3)
SODIUM SERPL-SCNC: 139 MMOL/L (ref 135–145)

## 2025-01-10 PROCEDURE — 36415 COLL VENOUS BLD VENIPUNCTURE: CPT | Performed by: INTERNAL MEDICINE

## 2025-01-10 PROCEDURE — 250N000013 HC RX MED GY IP 250 OP 250 PS 637: Performed by: PHYSICIAN ASSISTANT

## 2025-01-10 PROCEDURE — 84450 TRANSFERASE (AST) (SGOT): CPT | Performed by: INTERNAL MEDICINE

## 2025-01-10 PROCEDURE — 83735 ASSAY OF MAGNESIUM: CPT | Performed by: INTERNAL MEDICINE

## 2025-01-10 PROCEDURE — 84100 ASSAY OF PHOSPHORUS: CPT | Performed by: INTERNAL MEDICINE

## 2025-01-10 PROCEDURE — 82248 BILIRUBIN DIRECT: CPT | Performed by: INTERNAL MEDICINE

## 2025-01-10 PROCEDURE — 250N000009 HC RX 250: Performed by: INTERNAL MEDICINE

## 2025-01-10 PROCEDURE — 250N000011 HC RX IP 250 OP 636: Performed by: PHYSICIAN ASSISTANT

## 2025-01-10 PROCEDURE — 258N000003 HC RX IP 258 OP 636: Performed by: PHYSICIAN ASSISTANT

## 2025-01-10 PROCEDURE — 80048 BASIC METABOLIC PNL TOTAL CA: CPT | Performed by: PHYSICIAN ASSISTANT

## 2025-01-10 PROCEDURE — 84100 ASSAY OF PHOSPHORUS: CPT | Performed by: PHYSICIAN ASSISTANT

## 2025-01-10 PROCEDURE — 258N000003 HC RX IP 258 OP 636: Performed by: INTERNAL MEDICINE

## 2025-01-10 PROCEDURE — 99232 SBSQ HOSP IP/OBS MODERATE 35: CPT | Performed by: INTERNAL MEDICINE

## 2025-01-10 PROCEDURE — 36415 COLL VENOUS BLD VENIPUNCTURE: CPT | Performed by: PHYSICIAN ASSISTANT

## 2025-01-10 PROCEDURE — 120N000001 HC R&B MED SURG/OB

## 2025-01-10 RX ORDER — DEXTROSE MONOHYDRATE AND SODIUM CHLORIDE 5; .9 G/100ML; G/100ML
INJECTION, SOLUTION INTRAVENOUS CONTINUOUS
Status: DISCONTINUED | OUTPATIENT
Start: 2025-01-10 | End: 2025-01-11

## 2025-01-10 RX ADMIN — DEXTROSE AND SODIUM CHLORIDE: 5; 900 INJECTION, SOLUTION INTRAVENOUS at 20:29

## 2025-01-10 RX ADMIN — FOLIC ACID 1 MG: 1 TABLET ORAL at 08:13

## 2025-01-10 RX ADMIN — SODIUM PHOSPHATE, MONOBASIC, MONOHYDRATE AND SODIUM PHOSPHATE, DIBASIC, ANHYDROUS 15 MMOL: 142; 276 INJECTION, SOLUTION INTRAVENOUS at 12:20

## 2025-01-10 RX ADMIN — Medication 1 TABLET: at 08:14

## 2025-01-10 RX ADMIN — ONDANSETRON 4 MG: 4 TABLET, ORALLY DISINTEGRATING ORAL at 08:14

## 2025-01-10 RX ADMIN — SODIUM PHOSPHATE, MONOBASIC, MONOHYDRATE AND SODIUM PHOSPHATE, DIBASIC, ANHYDROUS 15 MMOL: 142; 276 INJECTION, SOLUTION INTRAVENOUS at 09:50

## 2025-01-10 RX ADMIN — THIAMINE HCL TAB 100 MG 100 MG: 100 TAB at 08:14

## 2025-01-10 RX ADMIN — SODIUM PHOSPHATE, MONOBASIC, MONOHYDRATE AND SODIUM PHOSPHATE, DIBASIC, ANHYDROUS 9 MMOL: 142; 276 INJECTION, SOLUTION INTRAVENOUS at 18:02

## 2025-01-10 RX ADMIN — DEXTROSE AND SODIUM CHLORIDE: 5; 900 INJECTION, SOLUTION INTRAVENOUS at 02:09

## 2025-01-10 RX ADMIN — DEXTROSE AND SODIUM CHLORIDE: 5; 900 INJECTION, SOLUTION INTRAVENOUS at 11:35

## 2025-01-10 RX ADMIN — PANTOPRAZOLE SODIUM 40 MG: 40 INJECTION, POWDER, FOR SOLUTION INTRAVENOUS at 08:14

## 2025-01-10 RX ADMIN — PROCHLORPERAZINE EDISYLATE 10 MG: 5 INJECTION INTRAMUSCULAR; INTRAVENOUS at 09:48

## 2025-01-10 RX ADMIN — PROCHLORPERAZINE EDISYLATE 10 MG: 5 INJECTION INTRAMUSCULAR; INTRAVENOUS at 15:09

## 2025-01-10 RX ADMIN — PANTOPRAZOLE SODIUM 40 MG: 40 INJECTION, POWDER, FOR SOLUTION INTRAVENOUS at 20:20

## 2025-01-10 ASSESSMENT — ACTIVITIES OF DAILY LIVING (ADL)
ADLS_ACUITY_SCORE: 29
ADLS_ACUITY_SCORE: 31
ADLS_ACUITY_SCORE: 29
DEPENDENT_IADLS:: INDEPENDENT
ADLS_ACUITY_SCORE: 29
ADLS_ACUITY_SCORE: 29
ADLS_ACUITY_SCORE: 31
ADLS_ACUITY_SCORE: 29
ADLS_ACUITY_SCORE: 31
ADLS_ACUITY_SCORE: 29
ADLS_ACUITY_SCORE: 31
ADLS_ACUITY_SCORE: 29
ADLS_ACUITY_SCORE: 29
ADLS_ACUITY_SCORE: 31
ADLS_ACUITY_SCORE: 29
ADLS_ACUITY_SCORE: 31

## 2025-01-10 NOTE — DISCHARGE INSTRUCTIONS
CHEMICAL HEALTH RESOURCES:  The first step to accessing chemical health treatment is to complete a Chemical Health Assessment.  Chemical Health Assessments are beneficial as the  can help you find a treatment program that is a good fit for your goals and needs. The  will make the referrals for you and help follow up until you get into a program.  To schedule an Assessment with [facility], call Behavioral Access at [phone].     [facility] program offers access to chemical health services (individual counseling, group counseling) and peer recovery support services virtually.  I've attached a flyer for this program to this e-mail.  For more information and to schedule, call: [phone].  I would suggest calling this program ASA if you are interested, as there is usually a waiting list to access this program.    To find recovery support meetings near you or online:  AA: https://a[place].org/meetings/ or call [phone]  NA: https://[facility].org/find-a-meeting/ or call [phone]  SMART Recovery: https://meetings.smartrecovery.org/meetings/  [facility]: https://refugerecoverymeetings.org/meetings  Celebrate Recovery: https://www.[employer].com/what-we-offer/find-a-cr-meeting  [place] Recovery Connection: https://[facility].org/ or call [phone] (All Recovery Meetings & Telephone Recovery Support)  [facility]: https://recoverydharma.org/meetings/    Ways to help cope with sobriety:   Take prescribed medicines as scheduled   Keep follow-up appointments   Talk to others about your concerns   Get regular exercise   Practice deep breathing skills   Eat a healthy diet   Use community resources, including hotline numbers, [facility] crisis and support meetings   Stay sober and avoid places/people/things associated with substance use   Maintain a daily schedule/routine   Get at least 7-8 hours of sleep per night   Create a list 10--20  healthy activities that you can do that are enjoyable and do not involve substance use   Create daily goals (approx. 1-4 goals) per day and work to achieve them throughout the day    [place] Recovery Connection (MRC): [facility] connects people seeking recovery to resources that help foster and sustain long-term recovery. Whether you are seeking resources for treatment, transportation, housing, job training, education, health care or other pathways to recovery, [facility] is a great place to start. Phone: [phone]. www.[facility].org (Great listing of all types of recovery and non-recovery related resources).    [place] [facility]  [address] [place] [address]  Phone: [phone]  E-mail: admin@[facility].Glopho  Website: https://www.[email or web address]/

## 2025-01-10 NOTE — PLAN OF CARE
"  Problem: Adult Inpatient Plan of Care  Goal: Plan of Care Review  Description: The Plan of Care Review/Shift note should be completed every shift.  The Outcome Evaluation is a brief statement about your assessment that the patient is improving, declining, or no change.  This information will be displayed automatically on your shift  note.  Outcome: Progressing  Flowsheets (Taken 1/9/2025 2202)  Outcome Evaluation: Pt A&O x4, denies pain, but has N & V, vomitted 1500 ml this shift. Pt does drink a lot of water as well. See I & O's. Mostly sleeping now, ambulated to BR, voiding well. CIWA scores low, no meds given. IV Zofran given at 2200 for N&V. Tele is SR.  Plan of Care Reviewed With: patient  Overall Patient Progress: improving  Goal: Patient-Specific Goal (Individualized)  Description: You can add care plan individualizations to a care plan. Examples of Individualization might be:  \"Parent requests to be called daily at 9am for status\", \"I have a hard time hearing out of my right ear\", or \"Do not touch me to wake me up as it startles  me\".  Outcome: Progressing  Goal: Absence of Hospital-Acquired Illness or Injury  Outcome: Progressing  Intervention: Identify and Manage Fall Risk  Recent Flowsheet Documentation  Taken 1/9/2025 1655 by Sharon Ortiz RN  Safety Promotion/Fall Prevention:   clutter free environment maintained   increased rounding and observation   activity supervised  Intervention: Prevent Skin Injury  Recent Flowsheet Documentation  Taken 1/9/2025 1655 by Sharon Ortiz RN  Body Position: position changed independently  Intervention: Prevent and Manage VTE (Venous Thromboembolism) Risk  Recent Flowsheet Documentation  Taken 1/9/2025 1655 by Sharon Ortiz RN  VTE Prevention/Management: (ambulation) other (see comments)  Intervention: Prevent Infection  Recent Flowsheet Documentation  Taken 1/9/2025 1655 by Sharon Ortiz RN  Infection Prevention:   hand hygiene promoted   " rest/sleep promoted  Goal: Optimal Comfort and Wellbeing  Outcome: Progressing  Goal: Readiness for Transition of Care  Outcome: Progressing  Intervention: Mutually Develop Transition Plan  Recent Flowsheet Documentation  Taken 1/9/2025 1800 by Sharon Ortiz RN  Equipment Currently Used at Home: none     Problem: Alcohol Withdrawal  Goal: Alcohol Withdrawal Symptom Control  Outcome: Progressing  Intervention: Minimize or Manage Alcohol Withdrawal Symptoms  Recent Flowsheet Documentation  Taken 1/9/2025 1655 by Sharon Ortiz RN  Aspiration Precautions: awake/alert before oral intake  Goal: Optimal Neurologic Function  Outcome: Progressing  Goal: Readiness for Change Identified  Outcome: Progressing   Goal Outcome Evaluation:      Plan of Care Reviewed With: patient    Overall Patient Progress: improvingOverall Patient Progress: improving    Outcome Evaluation: Pt A&O x4, denies pain, but has N & V, vomitted 1500 ml this shift. Pt does drink a lot of water as well. See I & O's. Mostly sleeping now, ambulated to BR, voiding well. CIWA scores low, no meds given. IV Zofran given at 2200 for N&V. Tele is SR.

## 2025-01-10 NOTE — CONSULTS
Care Management Discharge Note    Discharge Date: 01/12/2025     Discharge Disposition: Home    Discharge Services: None    Discharge DME: None    Private pay costs discussed: Not applicable    Education Provided on the Discharge Plan:  yes  Persons Notified of Discharge Plans: patient  Patient/Family in Agreement with the Plan: yes    Handoff Referral Completed: No, handoff not indicated or clinically appropriate    Additional Information:  ROMEO consulted for assistance with patient needing to obtain a primary care provider.  SW met with patient.  She lives with her parents, siblings and was previously independent, works as a HHA.  She met with CD but was not interested in an assessment or treatment at this time.  CD e-mailed her resources per her request. SW discussed options for primary care and shared list of [facility] near her home in [place].  Patient reports she will call herself to make an appt to establish care once home and aware of her schedule. Patient declines any other needs at this time.  ROMEO will sign off at this time.     Lyndsay Brody, CARMINA  [phone]

## 2025-01-10 NOTE — PROGRESS NOTES
[facility]    Hospitalist Progress Note  Name: Karlo Fuentes    MRN: [id number]  Provider:  Timi Toledo DO  Date of Service: 01/10/2025    Summary of Stay: Karlo Fuentes is a 34 year old female with a history of alcohol use disorder, pancreatitis, hypertension admitted on 1/9/2025 with nausea and vomiting.  In the emergency department, the patient is found to have a blood pressure 115/87, temperature 97.1  F, heart rate 120, respiratory rate 18, SpO2 98% on room air.  Initial lab work showed bicarb 11, BUN/creatinine 23.2/1.66, anion gap 43, AST/ALT 56/35, quantitative ketone greater than 9, venous pH 7.22 with a venous pCO2 of 33, CBC within normal limits.  Blood alcohol level upon arrival was 0.06.  The patient was started on IV fluids and PPI twice daily.  Patient was also started on CIWA protocol in the setting of alcohol withdrawal.    TODAY'S PLAN: Patient has vomited multiple times this morning including as most recently as 45 minutes prior to my encounter with her.  She denies any abdominal pain.  She does admit to intermittent coffee-ground emesis.  Suspect alcoholic gastritis.  Continue IV PPI twice daily.  Continue IV fluids for vomiting.  Would advance diet once her vomiting resolves.  If it does not resolve, will consider GI evaluation for EGD.  Continue CIWA protocol.  Appreciate chemical dependency recommendations.  Patient is open to therapy and would prefer individual therapy.  We discussed that it is up to her to seek this out.  She lives in a house with her parents and 2 siblings.  She reports that they know about her drinking and they are very supportive.  Anticipate another 1 to 2 days in the hospital pending the above.  Replacing electrolytes as well.    Problem List:   Acute Intractable Nausea and Vomiting  Suspected Alcoholic Gastritis  - PPI IV BID  - Clear liquid diet for now, advance if stops vomiting  - Antiemetics prn    Alcohol Use Disorder  Alcohol  Withdrawal  Alcoholic Ketosis  - Pt drinks 1 pint of vodka per day.  Last drink was 5 AM on 1/9.  Etoh level upon arrival was 0.06  - CIWA with ativan  - MV, Thiamine, FA  - Appreciate CD recommendations  - Encouraged sobriety    Hypophosphatemia  - Electrolyte replacement protocol    Acute Kidney Injury  - Baseline Cr = 0.9  - Improved with IVF  - Daily BMP     I spent 46 minutes in reviewing this patient's labs, imaging, medications, medical history.  In addition time was spent interviewing the patient, communicating with family, and medical decision making.      DVT Prophylaxis: Pneumatic Compression Devices  Code Status: Full Code  Diet: Clear Liquid Diet    Arguelles Catheter: Not present    Disposition: Medically Ready for Discharge: Anticipated in 2-4 Days    Goals to discharge include: n/v improved, tolerating oral intake, etoh withdrawal improved  Family updated today: No     Interval History   Pt seen and examined.  Pt reports vomited several times this morning.    -Data reviewed today: I personally reviewed all new labs and imaging results over the last 24 hours.     Physical Exam   Temp: 98.9  F (37.2  C) Temp src: Oral BP: 134/83 Pulse: 88   Resp: 16 SpO2: 100 % O2 Device: None (Room air)    Vitals:    01/09/25 0956   Weight: 58.7 kg (129 lb 6.6 oz)     Vital Signs with Ranges  Temp:  [97.3  F (36.3  C)-98.9  F (37.2  C)] 98.9  F (37.2  C)  Pulse:  [] 88  Resp:  [13-34] 16  BP: (130-165)/() 134/83  SpO2:  [92 %-100 %] 100 %  I/O last 3 completed shifts:  In: 1050 [P.O.:1050]  Out: 1750 [Emesis/NG output:1750]    GENERAL: No apparent distress. Awake, alert, and fully oriented.  HEENT: Normocephalic, atraumatic. Extraocular movements intact.  CARDIOVASCULAR: Regular rate and rhythm without murmurs or rubs. No S3.  PULMONARY: Clear bilaterally.  GASTROINTESTINAL: Soft, non-tender, non-distended. Bowel sounds normoactive, negative montez's sign  EXTREMITIES: No cyanosis or clubbing. No  "edema.  NEUROLOGICAL: CN 2-12 grossly intact, no focal neurological deficits.  DERMATOLOGICAL: No rash, ulcer, bruising, nor jaundice.    Medications   Current Facility-Administered Medications   Medication Dose Route Frequency Provider Last Rate Last Admin    dextrose 5% and 0.9% NaCl infusion   Intravenous Continuous Ayesha Gaston PA-C 100 mL/hr at 01/10/25 0209 New Bag at 01/10/25 0209     Current Facility-Administered Medications   Medication Dose Route Frequency Provider Last Rate Last Admin    folic acid (FOLVITE) tablet 1 mg  1 mg Oral Daily Ayesha Gaston PA-C   1 mg at 01/10/25 0813    multivitamin w/minerals (THERA-VIT-M) tablet 1 tablet  1 tablet Oral Daily Ayesha Gaston PA-C   1 tablet at 01/10/25 0814    pantoprazole (PROTONIX) IV push injection 40 mg  40 mg Intravenous BID Timi Toledo DO   40 mg at 01/10/25 0814    sodium chloride (PF) 0.9% PF flush 3 mL  3 mL Intracatheter Q8H Ayesha Gaston PA-C   3 mL at 01/09/25 2145    sodium phosphate 15 mmol in NS 250mL intermittent infusion  15 mmol Intravenous Q2H Timi Toledo DO   15 mmol at 01/10/25 0950    thiamine (B-1) tablet 100 mg  100 mg Oral Daily Ayesha Gaston PA-C   100 mg at 01/10/25 0814     Data     Laboratory:  Recent Labs   Lab 01/09/25  1009   WBC 8.1   HGB 14.9   HCT 44.1   MCV 88        Recent Labs   Lab 01/10/25  0654 01/09/25  1804 01/09/25  1009    138 139   POTASSIUM 3.6 4.2 4.2   CHLORIDE 102 99 85*   CO2 23 16* 11*   ANIONGAP 14 23* 43*   * 130* 137*   BUN 13.2 17.9 23.2*   CR 0.93 1.01* 1.66*   GFRESTIMATED 83 75 42*   DERICK 8.5* 8.5* 10.4     No results for input(s): \"CULT\" in the last 168 hours.  No results found for: \"TROPI\"    Imaging:  No results found for this or any previous visit (from the past 24 hours).      Timi Toledo DO  [facility] Hospitalist  [address] [place] [address]  Securely message with [employer] (more info)  Text page via [employer] " Paging/Directory   01/10/2025

## 2025-01-10 NOTE — PLAN OF CARE
"A&Ox 4, TELE SR. VSS RA.     IV: L PIV infusing D5 NS at 100ml/hr  Diet: clear  Activity: ind, but standby for IV assistance  Pain: denies    Some nausea noted this shift, 1 episodes of vomiting, about 400 ml liquid yellow. Chem dep, care/social following.     CIWA: 4, 1  Scoring in nausea only.     Temp: 97.3  F (36.3  C) Temp src: Oral BP: 130/64 Pulse: 85   Resp: 14 SpO2: 99 % O2 Device: None (Room air)                 Goal Outcome Evaluation:      Plan of Care Reviewed With: patient    Overall Patient Progress: improvingOverall Patient Progress: improving    Outcome Evaluation: has nasusa, but no vomitting on this shift      Problem: Adult Inpatient Plan of Care  Goal: Plan of Care Review  Description: The Plan of Care Review/Shift note should be completed every shift.  The Outcome Evaluation is a brief statement about your assessment that the patient is improving, declining, or no change.  This information will be displayed automatically on your shift  note.  Outcome: Progressing  Flowsheets (Taken 1/10/2025 0418)  Outcome Evaluation: has nasusa, but no vomitting on this shift  Plan of Care Reviewed With: patient  Overall Patient Progress: improving  Goal: Patient-Specific Goal (Individualized)  Description: You can add care plan individualizations to a care plan. Examples of Individualization might be:  \"Parent requests to be called daily at 9am for status\", \"I have a hard time hearing out of my right ear\", or \"Do not touch me to wake me up as it startles  me\".  Outcome: Progressing  Goal: Absence of Hospital-Acquired Illness or Injury  Outcome: Progressing  Intervention: Identify and Manage Fall Risk  Recent Flowsheet Documentation  Taken 1/10/2025 0337 by Lyndsay Mora, RN  Safety Promotion/Fall Prevention: safety round/check completed  Taken 1/10/2025 0211 by Lyndsay Mora, RN  Safety Promotion/Fall Prevention: safety round/check completed  Taken 1/10/2025 0024 by Lyndsay Mora, RN  Safety Promotion/Fall " Prevention: safety round/check completed  Intervention: Prevent Infection  Recent Flowsheet Documentation  Taken 1/10/2025 0024 by Lyndsay Mora RN  Infection Prevention:   rest/sleep promoted   single patient room provided   environmental surveillance performed  Goal: Optimal Comfort and Wellbeing  Outcome: Progressing  Goal: Readiness for Transition of Care  Outcome: Progressing     Problem: Alcohol Withdrawal  Goal: Alcohol Withdrawal Symptom Control  Outcome: Progressing  Goal: Optimal Neurologic Function  Outcome: Progressing  Goal: Readiness for Change Identified  Outcome: Progressing

## 2025-01-10 NOTE — CONSULTS
"Met with pt for CD Consult and introduced self and role in pt's care.  Inquired about pt's interest in KELIL Assessment for referrals to Tx or any additional community resources.  Pt stated \"yeah you could send me some resources\".  Sending pt community CD resources via secure e-mail to [email or web address] and adding to pt's AVS.    Relayed to pt that if they change their mind while admitted and would like to complete a KELLI Assessment for referrals to treatment or need any additional CD Resources they may alert unit staff who will assist in having CD Consult re-ordered.  If pt has active insurance they may also schedule an assessment on an outpatient basis by calling [facility] at [phone].    TIFFANIE Porter, [facility]  Substance Use Disorder Evaluation Counselor  Email: [email or web address]  "

## 2025-01-11 ENCOUNTER — APPOINTMENT (OUTPATIENT)
Dept: ULTRASOUND IMAGING | Facility: CLINIC | Age: 35
End: 2025-01-11
Attending: INTERNAL MEDICINE
Payer: COMMERCIAL

## 2025-01-11 LAB
ALBUMIN SERPL BCG-MCNC: 3.4 G/DL (ref 3.5–5.2)
ALP SERPL-CCNC: 77 U/L (ref 40–150)
ALT SERPL W P-5'-P-CCNC: 131 U/L (ref 0–50)
ANION GAP SERPL CALCULATED.3IONS-SCNC: 11 MMOL/L (ref 7–15)
AST SERPL W P-5'-P-CCNC: 229 U/L (ref 0–45)
BILIRUB SERPL-MCNC: 0.5 MG/DL
BUN SERPL-MCNC: 4.3 MG/DL (ref 6–20)
CALCIUM SERPL-MCNC: 8 MG/DL (ref 8.8–10.4)
CHLORIDE SERPL-SCNC: 105 MMOL/L (ref 98–107)
CREAT SERPL-MCNC: 0.76 MG/DL (ref 0.51–0.95)
EGFRCR SERPLBLD CKD-EPI 2021: >90 ML/MIN/1.73M2
ERYTHROCYTE [DISTWIDTH] IN BLOOD BY AUTOMATED COUNT: 14.2 % (ref 10–15)
GLUCOSE SERPL-MCNC: 124 MG/DL (ref 70–99)
HCO3 SERPL-SCNC: 23 MMOL/L (ref 22–29)
HCT VFR BLD AUTO: 32.6 % (ref 35–47)
HGB BLD-MCNC: 11 G/DL (ref 11.7–15.7)
INR PPP: 1.03 (ref 0.85–1.15)
MAGNESIUM SERPL-MCNC: 1.4 MG/DL (ref 1.7–2.3)
MAGNESIUM SERPL-MCNC: 2.6 MG/DL (ref 1.7–2.3)
MCH RBC QN AUTO: 29.6 PG (ref 26.5–33)
MCHC RBC AUTO-ENTMCNC: 33.7 G/DL (ref 31.5–36.5)
MCV RBC AUTO: 88 FL (ref 78–100)
PHOSPHATE SERPL-MCNC: 2.2 MG/DL (ref 2.5–4.5)
PLATELET # BLD AUTO: 181 10E3/UL (ref 150–450)
POTASSIUM SERPL-SCNC: 2.7 MMOL/L (ref 3.4–5.3)
POTASSIUM SERPL-SCNC: 3.5 MMOL/L (ref 3.4–5.3)
PROT SERPL-MCNC: 5.8 G/DL (ref 6.4–8.3)
RBC # BLD AUTO: 3.71 10E6/UL (ref 3.8–5.2)
SODIUM SERPL-SCNC: 139 MMOL/L (ref 135–145)
WBC # BLD AUTO: 3.1 10E3/UL (ref 4–11)

## 2025-01-11 PROCEDURE — 85610 PROTHROMBIN TIME: CPT | Performed by: INTERNAL MEDICINE

## 2025-01-11 PROCEDURE — 250N000013 HC RX MED GY IP 250 OP 250 PS 637: Performed by: PHYSICIAN ASSISTANT

## 2025-01-11 PROCEDURE — 250N000009 HC RX 250: Performed by: INTERNAL MEDICINE

## 2025-01-11 PROCEDURE — 120N000001 HC R&B MED SURG/OB

## 2025-01-11 PROCEDURE — 76705 ECHO EXAM OF ABDOMEN: CPT

## 2025-01-11 PROCEDURE — 250N000011 HC RX IP 250 OP 636: Performed by: PHYSICIAN ASSISTANT

## 2025-01-11 PROCEDURE — 250N000013 HC RX MED GY IP 250 OP 250 PS 637: Performed by: INTERNAL MEDICINE

## 2025-01-11 PROCEDURE — 80053 COMPREHEN METABOLIC PANEL: CPT | Performed by: INTERNAL MEDICINE

## 2025-01-11 PROCEDURE — 258N000003 HC RX IP 258 OP 636: Performed by: INTERNAL MEDICINE

## 2025-01-11 PROCEDURE — 84132 ASSAY OF SERUM POTASSIUM: CPT | Performed by: INTERNAL MEDICINE

## 2025-01-11 PROCEDURE — 85014 HEMATOCRIT: CPT | Performed by: INTERNAL MEDICINE

## 2025-01-11 PROCEDURE — 250N000011 HC RX IP 250 OP 636: Performed by: INTERNAL MEDICINE

## 2025-01-11 PROCEDURE — 83735 ASSAY OF MAGNESIUM: CPT | Performed by: INTERNAL MEDICINE

## 2025-01-11 PROCEDURE — 85041 AUTOMATED RBC COUNT: CPT | Performed by: INTERNAL MEDICINE

## 2025-01-11 PROCEDURE — 84100 ASSAY OF PHOSPHORUS: CPT | Performed by: INTERNAL MEDICINE

## 2025-01-11 PROCEDURE — 36415 COLL VENOUS BLD VENIPUNCTURE: CPT | Performed by: INTERNAL MEDICINE

## 2025-01-11 PROCEDURE — 99232 SBSQ HOSP IP/OBS MODERATE 35: CPT | Performed by: INTERNAL MEDICINE

## 2025-01-11 RX ORDER — POTASSIUM CHLORIDE 1500 MG/1
40 TABLET, EXTENDED RELEASE ORAL ONCE
Status: COMPLETED | OUTPATIENT
Start: 2025-01-11 | End: 2025-01-11

## 2025-01-11 RX ORDER — MAGNESIUM SULFATE HEPTAHYDRATE 40 MG/ML
4 INJECTION, SOLUTION INTRAVENOUS ONCE
Status: COMPLETED | OUTPATIENT
Start: 2025-01-11 | End: 2025-01-11

## 2025-01-11 RX ORDER — IBUPROFEN 400 MG/1
400 TABLET, FILM COATED ORAL EVERY 6 HOURS PRN
Status: DISCONTINUED | OUTPATIENT
Start: 2025-01-11 | End: 2025-01-12 | Stop reason: HOSPADM

## 2025-01-11 RX ORDER — POTASSIUM CHLORIDE 1500 MG/1
20 TABLET, EXTENDED RELEASE ORAL ONCE
Status: COMPLETED | OUTPATIENT
Start: 2025-01-11 | End: 2025-01-11

## 2025-01-11 RX ADMIN — POTASSIUM & SODIUM PHOSPHATES POWDER PACK 280-160-250 MG 1 PACKET: 280-160-250 PACK at 15:54

## 2025-01-11 RX ADMIN — Medication 5 MG: at 19:52

## 2025-01-11 RX ADMIN — POTASSIUM & SODIUM PHOSPHATES POWDER PACK 280-160-250 MG 1 PACKET: 280-160-250 PACK at 13:04

## 2025-01-11 RX ADMIN — POTASSIUM & SODIUM PHOSPHATES POWDER PACK 280-160-250 MG 1 PACKET: 280-160-250 PACK at 09:46

## 2025-01-11 RX ADMIN — PANTOPRAZOLE SODIUM 40 MG: 40 INJECTION, POWDER, FOR SOLUTION INTRAVENOUS at 07:47

## 2025-01-11 RX ADMIN — DEXTROSE AND SODIUM CHLORIDE: 5; 900 INJECTION, SOLUTION INTRAVENOUS at 05:24

## 2025-01-11 RX ADMIN — THIAMINE HCL TAB 100 MG 100 MG: 100 TAB at 07:47

## 2025-01-11 RX ADMIN — Medication 1 TABLET: at 07:47

## 2025-01-11 RX ADMIN — POTASSIUM CHLORIDE 40 MEQ: 1500 TABLET, EXTENDED RELEASE ORAL at 09:46

## 2025-01-11 RX ADMIN — MAGNESIUM SULFATE HEPTAHYDRATE 4 G: 40 INJECTION, SOLUTION INTRAVENOUS at 09:47

## 2025-01-11 RX ADMIN — FOLIC ACID 1 MG: 1 TABLET ORAL at 07:47

## 2025-01-11 RX ADMIN — PANTOPRAZOLE SODIUM 40 MG: 40 INJECTION, POWDER, FOR SOLUTION INTRAVENOUS at 19:49

## 2025-01-11 RX ADMIN — ONDANSETRON 4 MG: 4 TABLET, ORALLY DISINTEGRATING ORAL at 09:47

## 2025-01-11 RX ADMIN — POTASSIUM CHLORIDE 20 MEQ: 1500 TABLET, EXTENDED RELEASE ORAL at 13:04

## 2025-01-11 ASSESSMENT — ACTIVITIES OF DAILY LIVING (ADL)
ADLS_ACUITY_SCORE: 35
ADLS_ACUITY_SCORE: 31
ADLS_ACUITY_SCORE: 35
ADLS_ACUITY_SCORE: 31
ADLS_ACUITY_SCORE: 35
ADLS_ACUITY_SCORE: 31
ADLS_ACUITY_SCORE: 35
ADLS_ACUITY_SCORE: 35
ADLS_ACUITY_SCORE: 31
ADLS_ACUITY_SCORE: 35
ADLS_ACUITY_SCORE: 31
ADLS_ACUITY_SCORE: 31
ADLS_ACUITY_SCORE: 35
ADLS_ACUITY_SCORE: 35

## 2025-01-11 NOTE — PROGRESS NOTES
[facility]    Hospitalist Progress Note  Name: Karlo Fuentes    MRN: [id number]  Provider:  Timi Toledo DO  Date of Service: 01/11/2025    Summary of Stay: Karlo Fuentes is a 34 year old female with a history of alcohol use disorder, pancreatitis, hypertension admitted on 1/9/2025 with nausea and vomiting.  In the emergency department, the patient is found to have a blood pressure 115/87, temperature 97.1  F, heart rate 120, respiratory rate 18, SpO2 98% on room air.  Initial lab work showed bicarb 11, BUN/creatinine 23.2/1.66, anion gap 43, AST/ALT 56/35, quantitative ketone greater than 9, venous pH 7.22 with a venous pCO2 of 33, CBC within normal limits.  Blood alcohol level upon arrival was 0.06.  The patient was started on IV fluids and PPI twice daily.  Patient was also started on CIWA protocol in the setting of alcohol withdrawal.     TODAY'S PLAN: Vomiting and improved this morning.  Electrolytes still low with potassium of 2.7.  Also LFTs have gone up despite IV fluids.  Possibly secondary to her nausea and vomiting as well as her alcohol use.  Regardless, will check right upper quadrant ultrasound for any gallbladder/biliary etiologies.  Continue IV PPI.  If LFTs stable or improved and potassium improved, anticipate discharge home tomorrow.  Work note provided.    Problem List:   Acute Intractable Nausea and Vomiting  Suspected Alcoholic Gastritis  - PPI IV BID  - ADAT  - Antiemetics prn  - Improving    Transaminitis  - Possibly secondary to n/v, alcohol abuse  - Check RUQ US  - Check INR  - No tylenol  - Daily Hepatic Panel     Alcohol Use Disorder  Alcohol Withdrawal  Alcoholic Ketosis  - Pt drinks 1 pint of vodka per day.  Last drink was 5 AM on 1/9.  Etoh level upon arrival was 0.06  - CIWA with ativan  - MV, Thiamine, FA  - Appreciate CD recommendations  - Encouraged sobriety     Hypophosphatemia  Hypokalemia  Hypomagnesemia  - Electrolyte replacement protocol     Acute  Kidney Injury  - Baseline Cr = 0.9  - Improved with IVF  - Daily BMP     Leukopenia  Normocytic Anemia  - Likely secondary to hemodilution and etoh abuse  - Daily CBC    I spent 48 minutes in reviewing this patient's labs, imaging, medications, medical history.  In addition time was spent interviewing the patient, communicating with family, and medical decision making.      DVT Prophylaxis: Pneumatic Compression Devices  Code Status: Full Code  Diet: NPO for Medical/Clinical Reasons Except for: Meds    Arguelles Catheter: Not present    Disposition: Medically Ready for Discharge: Anticipated Tomorrow    Goals to discharge include: LFTs stable or improved, tolerating oral intake, electrolytes improved  Family updated today: No     Interval History   Pt seen and examined.  Pt reports vomiting twice overnight, but feels a bit better this morning.    -Data reviewed today: I personally reviewed all new labs and imaging results over the last 24 hours.     Physical Exam   Temp: 98.8  F (37.1  C) Temp src: Oral BP: 135/72 Pulse: 77   Resp: 18 SpO2: 99 % O2 Device: None (Room air)    Vitals:    01/09/25 0956   Weight: 58.7 kg (129 lb 6.6 oz)     Vital Signs with Ranges  Temp:  [97.6  F (36.4  C)-99  F (37.2  C)] 98.8  F (37.1  C)  Pulse:  [76-80] 77  Resp:  [16-18] 18  BP: (132-135)/(72-83) 135/72  SpO2:  [99 %-100 %] 99 %  I/O last 3 completed shifts:  In: 4249 [P.O.:4240; I.V.:9]  Out: 500 [Urine:500]    GENERAL: No apparent distress. Awake, alert, and fully oriented.  HEENT: Normocephalic, atraumatic. Extraocular movements intact.  CARDIOVASCULAR: Regular rate and rhythm without murmurs or rubs. No S3.  PULMONARY: Clear bilaterally.  GASTROINTESTINAL: Soft, non-tender, non-distended. Bowel sounds normoactive.   EXTREMITIES: No cyanosis or clubbing. No edema.  NEUROLOGICAL: CN 2-12 grossly intact, no focal neurological deficits.  DERMATOLOGICAL: No rash, ulcer, bruising, nor jaundice.    Medications   Current  "Facility-Administered Medications   Medication Dose Route Frequency Provider Last Rate Last Admin     Current Facility-Administered Medications   Medication Dose Route Frequency Provider Last Rate Last Admin    folic acid (FOLVITE) tablet 1 mg  1 mg Oral Daily Ayesha Gaston PA-C   1 mg at 01/10/25 0813    multivitamin w/minerals (THERA-VIT-M) tablet 1 tablet  1 tablet Oral Daily Ayesha Gaston PA-C   1 tablet at 01/10/25 0814    pantoprazole (PROTONIX) IV push injection 40 mg  40 mg Intravenous BID Timi Toledo,    40 mg at 01/10/25 2020    sodium chloride (PF) 0.9% PF flush 3 mL  3 mL Intracatheter Q8H Ayesha Gaston PA-C   3 mL at 01/09/25 2145    thiamine (B-1) tablet 100 mg  100 mg Oral Daily Ayesha Gaston PA-C   100 mg at 01/10/25 0814     Data     Laboratory:  Recent Labs   Lab 01/11/25  0636 01/09/25  1009   WBC 3.1* 8.1   HGB 11.0* 14.9   HCT 32.6* 44.1   MCV 88 88    382     Recent Labs   Lab 01/11/25  0636 01/10/25  1714 01/10/25  0654 01/09/25  1804 01/09/25  1009     --  139 138 139   POTASSIUM 2.7*  --  3.6 4.2 4.2   CHLORIDE 105  --  102 99 85*   CO2 23  --  23 16* 11*   ANIONGAP 11  --  14 23* 43*   *  --  128* 130* 137*   BUN 4.3*  --  13.2 17.9 23.2*   CR 0.76  --  0.93 1.01* 1.66*   GFRESTIMATED >90  --  83 75 42*   DERICK 8.0*  --  8.5* 8.5* 10.4   MAG 1.4*  --  1.9  --  2.0   PHOS 2.2* 2.1* 0.6*  --   --    PROTTOTAL 5.8*  --  6.9  --  10.0*   ALBUMIN 3.4*  --  4.0  --  5.3*   BILITOTAL 0.5  --  0.6  --  0.3   ALKPHOS 77  --  84  --  136   *  --  49*  --  56*   *  --  27  --  35     No results for input(s): \"CULT\" in the last 168 hours.  No results found for: \"TROPI\"    Imaging:  No results found for this or any previous visit (from the past 24 hours).      Timi Toledo DO  [facility] Hospitalist  [address] [place] [address]  Securely message with [employer] (more info)  Text page via [facility] Paging/Directory   01/11/2025   "

## 2025-01-11 NOTE — PLAN OF CARE
"Goal Outcome Evaluation:      Plan of Care Reviewed With: patient    Overall Patient Progress: improving    Outcome Evaluation:    Alert and oriented x 4  VSS  , Calm and cooperative . Last CIWA score 0 . Pt reported that nausea has subsided . Abdomen is soft , +bowel sounds, +epigastric tenderness . No bleeding from any site .  Scheduled  pantoprazole provided . Dextrose 5% and 0.9% NaCl infusion running at 100 mL/hr .       Problem: Adult Inpatient Plan of Care  Goal: Plan of Care Review  Description: The Plan of Care Review/Shift note should be completed every shift.  The Outcome Evaluation is a brief statement about your assessment that the patient is improving, declining, or no change.  This information will be displayed automatically on your shift  note.  Outcome: Progressing  Flowsheets (Taken 1/11/2025 0340)  Outcome Evaluation: VS  Plan of Care Reviewed With: patient  Overall Patient Progress: improving  Goal: Patient-Specific Goal (Individualized)  Description: You can add care plan individualizations to a care plan. Examples of Individualization might be:  \"Parent requests to be called daily at 9am for status\", \"I have a hard time hearing out of my right ear\", or \"Do not touch me to wake me up as it startles  me\".  Outcome: Progressing  Goal: Absence of Hospital-Acquired Illness or Injury  Outcome: Progressing  Intervention: Identify and Manage Fall Risk  Recent Flowsheet Documentation  Taken 1/10/2025 1950 by Jc Jackson, RN  Safety Promotion/Fall Prevention:   treat underlying cause   safety round/check completed   room organization consistent   room near nurse's station   room door open   clutter free environment maintained   lighting adjusted   nonskid shoes/slippers when out of bed   patient and family education  Taken 1/10/2025 1921 by Jc Jackson, RN  Safety Promotion/Fall Prevention: safety round/check completed  Intervention: Prevent Skin Injury  Recent Flowsheet Documentation  Taken " 1/10/2025 1950 by Jc Jackson RN  Body Position: position changed independently  Goal: Optimal Comfort and Wellbeing  Outcome: Progressing  Goal: Readiness for Transition of Care  Outcome: Progressing     Problem: Alcohol Withdrawal  Goal: Alcohol Withdrawal Symptom Control  Outcome: Progressing  Goal: Optimal Neurologic Function  Outcome: Progressing  Goal: Readiness for Change Identified  Outcome: Progressing

## 2025-01-12 VITALS
DIASTOLIC BLOOD PRESSURE: 71 MMHG | SYSTOLIC BLOOD PRESSURE: 141 MMHG | TEMPERATURE: 98.2 F | HEIGHT: 64 IN | WEIGHT: 129.41 LBS | OXYGEN SATURATION: 100 % | RESPIRATION RATE: 16 BRPM | BODY MASS INDEX: 22.09 KG/M2 | HEART RATE: 79 BPM

## 2025-01-12 LAB
ALBUMIN SERPL BCG-MCNC: 3.5 G/DL (ref 3.5–5.2)
ALP SERPL-CCNC: 89 U/L (ref 40–150)
ALT SERPL W P-5'-P-CCNC: 337 U/L (ref 0–50)
ANION GAP SERPL CALCULATED.3IONS-SCNC: 13 MMOL/L (ref 7–15)
AST SERPL W P-5'-P-CCNC: 506 U/L (ref 0–45)
BILIRUB SERPL-MCNC: 0.4 MG/DL
BUN SERPL-MCNC: 2.7 MG/DL (ref 6–20)
CALCIUM SERPL-MCNC: 8.7 MG/DL (ref 8.8–10.4)
CHLORIDE SERPL-SCNC: 101 MMOL/L (ref 98–107)
CREAT SERPL-MCNC: 0.76 MG/DL (ref 0.51–0.95)
EGFRCR SERPLBLD CKD-EPI 2021: >90 ML/MIN/1.73M2
ERYTHROCYTE [DISTWIDTH] IN BLOOD BY AUTOMATED COUNT: 14 % (ref 10–15)
GLUCOSE SERPL-MCNC: 108 MG/DL (ref 70–99)
HCO3 SERPL-SCNC: 24 MMOL/L (ref 22–29)
HCT VFR BLD AUTO: 31.4 % (ref 35–47)
HGB BLD-MCNC: 10.7 G/DL (ref 11.7–15.7)
MAGNESIUM SERPL-MCNC: 2 MG/DL (ref 1.7–2.3)
MCH RBC QN AUTO: 29.9 PG (ref 26.5–33)
MCHC RBC AUTO-ENTMCNC: 34.1 G/DL (ref 31.5–36.5)
MCV RBC AUTO: 88 FL (ref 78–100)
PHOSPHATE SERPL-MCNC: 2.7 MG/DL (ref 2.5–4.5)
PLATELET # BLD AUTO: 162 10E3/UL (ref 150–450)
POTASSIUM SERPL-SCNC: 3.7 MMOL/L (ref 3.4–5.3)
PROT SERPL-MCNC: 6.3 G/DL (ref 6.4–8.3)
RBC # BLD AUTO: 3.58 10E6/UL (ref 3.8–5.2)
SODIUM SERPL-SCNC: 138 MMOL/L (ref 135–145)
WBC # BLD AUTO: 3.3 10E3/UL (ref 4–11)

## 2025-01-12 PROCEDURE — 99239 HOSP IP/OBS DSCHRG MGMT >30: CPT | Performed by: INTERNAL MEDICINE

## 2025-01-12 PROCEDURE — 80053 COMPREHEN METABOLIC PANEL: CPT | Performed by: INTERNAL MEDICINE

## 2025-01-12 PROCEDURE — 84100 ASSAY OF PHOSPHORUS: CPT | Performed by: INTERNAL MEDICINE

## 2025-01-12 PROCEDURE — 83735 ASSAY OF MAGNESIUM: CPT | Performed by: INTERNAL MEDICINE

## 2025-01-12 PROCEDURE — 250N000013 HC RX MED GY IP 250 OP 250 PS 637: Performed by: PHYSICIAN ASSISTANT

## 2025-01-12 PROCEDURE — 85018 HEMOGLOBIN: CPT | Performed by: INTERNAL MEDICINE

## 2025-01-12 PROCEDURE — 36415 COLL VENOUS BLD VENIPUNCTURE: CPT | Performed by: INTERNAL MEDICINE

## 2025-01-12 PROCEDURE — 250N000013 HC RX MED GY IP 250 OP 250 PS 637: Performed by: INTERNAL MEDICINE

## 2025-01-12 RX ORDER — MULTIPLE VITAMINS W/ MINERALS TAB 9MG-400MCG
1 TAB ORAL DAILY
Qty: 30 TABLET | Refills: 0 | Status: SHIPPED | OUTPATIENT
Start: 2025-01-12

## 2025-01-12 RX ORDER — LANOLIN ALCOHOL/MO/W.PET/CERES
100 CREAM (GRAM) TOPICAL DAILY
Qty: 30 TABLET | Refills: 0 | Status: SHIPPED | OUTPATIENT
Start: 2025-01-12

## 2025-01-12 RX ORDER — PANTOPRAZOLE SODIUM 40 MG/1
40 TABLET, DELAYED RELEASE ORAL
Status: DISCONTINUED | OUTPATIENT
Start: 2025-01-12 | End: 2025-01-12 | Stop reason: HOSPADM

## 2025-01-12 RX ORDER — FOLIC ACID 1 MG/1
1 TABLET ORAL DAILY
Qty: 30 TABLET | Refills: 0 | Status: SHIPPED | OUTPATIENT
Start: 2025-01-12

## 2025-01-12 RX ORDER — PANTOPRAZOLE SODIUM 40 MG/1
40 TABLET, DELAYED RELEASE ORAL DAILY
Qty: 14 TABLET | Refills: 0 | Status: SHIPPED | OUTPATIENT
Start: 2025-01-12 | End: 2025-01-26

## 2025-01-12 RX ADMIN — THIAMINE HCL TAB 100 MG 100 MG: 100 TAB at 08:13

## 2025-01-12 RX ADMIN — Medication 1 TABLET: at 08:13

## 2025-01-12 RX ADMIN — FOLIC ACID 1 MG: 1 TABLET ORAL at 08:13

## 2025-01-12 RX ADMIN — PANTOPRAZOLE SODIUM 40 MG: 40 TABLET, DELAYED RELEASE ORAL at 08:13

## 2025-01-12 ASSESSMENT — ACTIVITIES OF DAILY LIVING (ADL)
ADLS_ACUITY_SCORE: 35
ADLS_ACUITY_SCORE: 34
ADLS_ACUITY_SCORE: 35
ADLS_ACUITY_SCORE: 35
ADLS_ACUITY_SCORE: 34
ADLS_ACUITY_SCORE: 34
ADLS_ACUITY_SCORE: 35

## 2025-01-12 NOTE — PLAN OF CARE
"Goal Outcome Evaluation:      Plan of Care Reviewed With: patient    Overall Patient Progress: improvingOverall Patient Progress: improving    Outcome Evaluation: Pt A&O. Denies pain. Potassium, phosphorus and magnesium replaced. CIWA scores 0. Had US. Transfers with SBA. AUO. Tolerating reg diet, int. Nausea, zofran given. Tele reads SR.      Problem: Adult Inpatient Plan of Care  Goal: Plan of Care Review  Description: The Plan of Care Review/Shift note should be completed every shift.  The Outcome Evaluation is a brief statement about your assessment that the patient is improving, declining, or no change.  This information will be displayed automatically on your shift  note.  Outcome: Progressing  Flowsheets (Taken 1/11/2025 1830)  Outcome Evaluation: Pt A&O. Denies pain. Potassium, phosphorus and magnesium replaced. CIWA scores 0. Had US. Transfers with SBA. AUO. Tolerating reg diet, int. Nausea, zofran given. Tele reads SR.  Plan of Care Reviewed With: patient  Overall Patient Progress: improving  Goal: Patient-Specific Goal (Individualized)  Description: You can add care plan individualizations to a care plan. Examples of Individualization might be:  \"Parent requests to be called daily at 9am for status\", \"I have a hard time hearing out of my right ear\", or \"Do not touch me to wake me up as it startles  me\".  Outcome: Progressing  Goal: Absence of Hospital-Acquired Illness or Injury  Outcome: Progressing  Intervention: Identify and Manage Fall Risk  Recent Flowsheet Documentation  Taken 1/11/2025 0754 by Doris Manzano RN  Safety Promotion/Fall Prevention: activity supervised  Intervention: Prevent Skin Injury  Recent Flowsheet Documentation  Taken 1/11/2025 0754 by Doris Manzano, RN  Body Position: position changed independently  Taken 1/11/2025 0752 by Doris Manzano, RN  Body Position: position changed independently  Intervention: Prevent Infection  Recent Flowsheet Documentation  Taken 1/11/2025 " 0754 by Doris Manzano, RN  Infection Prevention:   rest/sleep promoted   single patient room provided  Goal: Optimal Comfort and Wellbeing  Outcome: Progressing  Goal: Readiness for Transition of Care  Outcome: Progressing     Problem: Alcohol Withdrawal  Goal: Alcohol Withdrawal Symptom Control  Outcome: Progressing  Goal: Optimal Neurologic Function  Outcome: Progressing  Goal: Readiness for Change Identified  Outcome: Progressing

## 2025-01-12 NOTE — DISCHARGE SUMMARY
Hospitalist Discharge Summary  [facility]    Karlo Fuentes MRN# [id number]   YOB: 1990 Age: 34 year old     Date of Admission:  1/9/2025  Date of Discharge:  1/12/2025 10:40 AM  Admitting Physician:  Alfredo Hinton MD  Discharge Physician:  Timi Toledo DO  Discharging Service:  Hospitalist     Primary Provider: No Ref-Primary, Physician          Discharge Diagnosis:     Acute Intractable Nausea and Vomiting  Suspected Alcoholic Gastritis  - PPI IV BID  - ADAT  - Antiemetics prn  - Improving     Transaminitis  Hepatic Steatosis  - Possibly secondary to n/v, alcohol abuse  - RUQ US showed hepatic steatosis  - INR = 1.03  - No tylenol  - Discussed that if she does not quit drinking she could develop cirrhosis  - LFT's trended up today, but bili and alk phos normal.  Suspect medication induced vs poor oral intake.  Ordered follow up hepatic panel for tomorrow 1/13 to be done as an outpatient  - Daily Hepatic Panel     Alcohol Use Disorder  Alcohol Withdrawal  Alcoholic Ketosis  - Pt drinks 1 pint of vodka per day.  Last drink was 5 AM on 1/9.  Etoh level upon arrival was 0.06  - CIWA with ativan.  Now discontinued given low scores  - MV, Thiamine, FA  - Appreciate CD recommendations  - Encouraged sobriety     Hypophosphatemia  Hypokalemia  Hypomagnesemia  - Electrolyte replacement protocol     Acute Kidney Injury  - Baseline Cr = 0.9  - Improved with IVF  - Daily BMP      Leukopenia  Normocytic Anemia  - Likely secondary to hemodilution and etoh abuse  - Daily CBC             Discharge Disposition:     Discharged to home           Hospital Course:     Karlo Fuentes is a 34 year old female with a history of alcohol use disorder, pancreatitis, hypertension admitted on 1/9/2025 with nausea and vomiting.  In the emergency department, the patient is found to have a blood pressure 115/87, temperature 97.1  F, heart rate 120, respiratory rate 18, SpO2 98% on room air.   "Initial lab work showed bicarb 11, BUN/creatinine 23.2/1.66, anion gap 43, AST/ALT 56/35, quantitative ketone greater than 9, venous pH 7.22 with a venous pCO2 of 33, CBC within normal limits.  Blood alcohol level upon arrival was 0.06.  The patient was started on IV fluids and PPI twice daily.  Patient was also started on CIWA protocol in the setting of alcohol withdrawal.  The patient's hospital course was complicated by rising LFTs.  Right upper quadrant ultrasound showed hepatic steatosis without any gallbladder or biliary etiologies.  The patient's nausea and vomiting improved and she was able to tolerate oral intake.  On 1/12/2025, the patient was discharged home.     The patient was seen, examined, and counseled on this day. The hospitalization and plan of care was reviewed with the patient extensively. All questions were addressed and the patient agreed to follow-up as noted above.           Allergies:     No Known Allergies           Discharge Medications:     Discharge Medication List as of 1/12/2025 10:14 AM        START taking these medications    Details   folic acid (FOLVITE) 1 MG tablet Take 1 tablet (1 mg) by mouth daily., Disp-30 tablet, R-0, E-Prescribe      multivitamin w/minerals (THERA-VIT-M) tablet Take 1 tablet by mouth daily., Disp-30 tablet, R-0, E-Prescribe      pantoprazole (PROTONIX) 40 MG EC tablet Take 1 tablet (40 mg) by mouth daily for 14 days., Disp-14 tablet, R-0, E-Prescribe      thiamine (B-1) 100 MG tablet Take 1 tablet (100 mg) by mouth daily., Disp-30 tablet, R-0, E-Prescribe                    Condition on Discharge:     Discharge condition: Fair   Discharge vitals: Blood pressure (!) 141/71, pulse 79, temperature 98.2  F (36.8  C), temperature source Oral, resp. rate 16, height 1.626 m (5' 4\"), weight 58.7 kg (129 lb 6.6 oz), last menstrual period 01/02/2025, SpO2 100%.   Code status on discharge: Full Code      BASIC PHYSICAL EXAMINATION:  GENERAL: No apparent " distress.  CARDIOVASCULAR: Regular rate and rhythm without murmurs.  PULMONARY: Clear to auscultation bilaterally.   GASTROINTESTINAL: Abdomen soft, non-tender.  EXTREMITIES: No edema, pulses intact.  NEUROLOGIC: No focal deficits.            History of Illness:   See detailed admission note for full details.               Procedures excluding imaging which is summarized below:     Please see details in the electronic medical record.           Consultations:     CARE MANAGEMENT / SOCIAL WORK IP CONSULT  CHEMICAL DEPENDENCY IP CONSULT  CARE MANAGEMENT / SOCIAL WORK IP CONSULT          Significant Results:     Results for orders placed or performed during the hospital encounter of 01/09/25   US Abdomen Limited    Narrative    EXAM: ULTRASOUND ABDOMEN LIMITED  LOCATION: [facility]  DATE: 01/11/2025    INDICATION: Elevated liver function tests. Vomiting. Epigastric pain.  COMPARISON: CT of the abdomen and pelvis 04/05/2024.  TECHNIQUE: Limited abdominal ultrasound.    FINDINGS:    GALLBLADDER: Normal. No gallstones, wall thickening, or pericholecystic fluid. Negative sonographic Douglas's sign.    BILE DUCTS: No biliary dilatation. The common duct measures 2 mm. Portions of the common duct could not be visualized due to overlying bowel gas.     LIVER: Hepatic steatosis. No focal mass.    RIGHT KIDNEY: No hydronephrosis.    PANCREAS: The visualized portions are unremarkable.    No ascites.      Impression    IMPRESSION:  1.  Hepatic steatosis.  2.  Otherwise, unremarkable right upper quadrant ultrasound. No biliary dilatation.         Transthoracic Echocardiogram Results:  No results found for this or any previous visit (from the past 4320 hours).             Pending Results:     Unresulted Labs Ordered in the Past 30 Days of this Admission       No orders found from 12/10/2024 to 1/10/2025.                        Discharge Instructions and Follow-Up:     Discharge instructions and follow-up:    Discharge Procedure Orders   Hepatic function panel   Standing Status: Future Standing Exp. Date: 01/16/25     Primary Care Referral   Standing Status: Future   Referral Priority: Urgent: 3-5 Days Referral Type: Consultation   Number of Visits Requested: 1     Reason for your hospital stay   Order Comments: Suspected Alcoholic Gastritis, Alcohol Use Disorder, Hepatic Steatosis     Activity   Order Comments: Your activity upon discharge: activity as tolerated     Order Specific Question Answer Comments   Is discharge order? Yes      Follow-up and recommended labs and tests    Order Comments: Follow up with primary care provider within 3-4 days for hospital follow- up.  The following labs/tests are recommended: CBC, CMP.  [facility] should call you to set up an appointment.  Regardless, recommend you contact a clinic on Monday to set up an appointment for this week.  I have also ordered repeat liver enzymes for Monday as well so come to the hospital lab to get those drawn.  Make sure you are drinking water and hydrating as that will help improve the liver enzymes.  Avoid tylenol until they return to the normal range.  If you develop worsening abdominal pain, nausea or vomiting, fevers or chills, then return to the emergency department.    Recommend you follow alcohol abstinence.     Diet   Order Comments: Follow this diet upon discharge: Current Diet:Orders Placed This Encounter      Regular Diet Adult     Order Specific Question Answer Comments   Is discharge order? Yes           Total time spent in face to face contact with the patient and coordinating discharge was:  35 Minutes    DO KERI Valdez Hospitalist  [address] [place] [address]  01/12/2025

## 2025-01-12 NOTE — PROGRESS NOTES
[facility]    Hospitalist Progress Note  Name: Karlo Fuentes    MRN: [id number]  Provider:  Timi Toledo DO  Date of Service: 01/12/2025    Summary of Stay: Karlo Fuentes is a 34 year old female with a history of alcohol use disorder, pancreatitis, hypertension admitted on 1/9/2025 with nausea and vomiting.  In the emergency department, the patient is found to have a blood pressure 115/87, temperature 97.1  F, heart rate 120, respiratory rate 18, SpO2 98% on room air.  Initial lab work showed bicarb 11, BUN/creatinine 23.2/1.66, anion gap 43, AST/ALT 56/35, quantitative ketone greater than 9, venous pH 7.22 with a venous pCO2 of 33, CBC within normal limits.  Blood alcohol level upon arrival was 0.06.  The patient was started on IV fluids and PPI twice daily.  Patient was also started on CIWA protocol in the setting of alcohol withdrawal.  The patient's hospital course was complicated by rising LFTs.  Right upper quadrant ultrasound showed hepatic steatosis without any gallbladder or biliary etiologies.  The patient's nausea and vomiting improved and she was able to tolerate oral intake.  On 1/12/2025, the patient was discharged home.    TODAY'S PLAN: Patient feeling much better today.  No nausea or vomiting overnight.  Denies any epigastric or abdominal pain.  Right upper quadrant ultrasound showed hepatic steatosis but negative for gallbladder/biliary etiologies.  Electrolytes improved today as well.  Discontinue CIWA given her improved scoring.  If LFTs stable or improving anticipate discharge home today.  Patient encouraged to set up with a primary care doctor and follow-up with them later this week for lab work to check electrolytes and liver enzymes.  All questions answered.  Mother will pick her up.  ADDENDUM:  LFTs slightly increased.  Suspect from decreased oral intake.  Pt feels quite well. VSS.  Recommend repeat hepatic panel on Tuesday 1/14.  Lab ordered at discharge.  Ok to  discharge home today.  This was discussed with the patient.  She was offered to stay in the hospital for repeat lab test tomorrow, but pt wishes to go home.  This seems reasonable.  Reassuring US and normal bilirubin.  Proceed with discharge home.  Encouraged oral intake.    Problem List:   Acute Intractable Nausea and Vomiting  Suspected Alcoholic Gastritis  - PPI IV BID  - ADAT  - Antiemetics prn  - Improving     Transaminitis  Hepatic Steatosis  - Possibly secondary to n/v, alcohol abuse  - RUQ US showed hepatic steatosis  - INR = 1.03  - No tylenol  - Discussed that if she does not quit drinking she could develop cirrhosis  - Daily Hepatic Panel     Alcohol Use Disorder  Alcohol Withdrawal  Alcoholic Ketosis  - Pt drinks 1 pint of vodka per day.  Last drink was 5 AM on 1/9.  Etoh level upon arrival was 0.06  - CIWA with ativan.  Now discontinued given low scores  - MV, Thiamine, FA  - Appreciate CD recommendations  - Encouraged sobriety     Hypophosphatemia  Hypokalemia  Hypomagnesemia  - Electrolyte replacement protocol     Acute Kidney Injury  - Baseline Cr = 0.9  - Improved with IVF  - Daily BMP      Leukopenia  Normocytic Anemia  - Likely secondary to hemodilution and etoh abuse  - Daily CBC    I spent 46 minutes in reviewing this patient's labs, imaging, medications, medical history.  In addition time was spent interviewing the patient, communicating with family, and medical decision making.      DVT Prophylaxis: Pneumatic Compression Devices  Code Status: Full Code  Diet: Regular Diet Adult    Arguelles Catheter: Not present    Disposition: Medically Ready for Discharge: Ready Now    Goals to discharge include: LFTs stable or improved, tolerating oral intake  Family updated today: No     Interval History   Pt seen and examined.  Pt reports feeling much better today.    -Data reviewed today: I personally reviewed all new labs and imaging results over the last 24 hours.     Physical Exam   Temp: 98.2  F (36.8   C) Temp src: Oral BP: (!) 141/71 Pulse: 79   Resp: 16 SpO2: 100 % O2 Device: None (Room air)    Vitals:    01/09/25 0956   Weight: 58.7 kg (129 lb 6.6 oz)     Vital Signs with Ranges  Temp:  [98.2  F (36.8  C)-99.1  F (37.3  C)] 98.2  F (36.8  C)  Pulse:  [70-79] 79  Resp:  [16] 16  BP: (141-158)/() 141/71  SpO2:  [100 %] 100 %  No intake/output data recorded.    GENERAL: No apparent distress. Awake, alert, and fully oriented.  HEENT: Normocephalic, atraumatic. Extraocular movements intact.  CARDIOVASCULAR: Regular rate and rhythm without murmurs or rubs. No S3.  PULMONARY: Clear bilaterally.  GASTROINTESTINAL: Soft, non-tender, non-distended. Bowel sounds normoactive.   EXTREMITIES: No cyanosis or clubbing. No edema.  NEUROLOGICAL: CN 2-12 grossly intact, no focal neurological deficits.  DERMATOLOGICAL: No rash, ulcer, bruising, nor jaundice.    Medications   Current Facility-Administered Medications   Medication Dose Route Frequency Provider Last Rate Last Admin     Current Facility-Administered Medications   Medication Dose Route Frequency Provider Last Rate Last Admin    folic acid (FOLVITE) tablet 1 mg  1 mg Oral Daily Ayesha Gaston PA-C   1 mg at 01/11/25 0747    multivitamin w/minerals (THERA-VIT-M) tablet 1 tablet  1 tablet Oral Daily Ayesha Gaston PA-C   1 tablet at 01/11/25 0747    pantoprazole (PROTONIX) EC tablet 40 mg  40 mg Oral BID Timi Billingsley,         sodium chloride (PF) 0.9% PF flush 3 mL  3 mL Intracatheter Q8H Ayesha Gaston PA-C   3 mL at 01/11/25 1554    thiamine (B-1) tablet 100 mg  100 mg Oral Daily Ayesha Gaston PA-C   100 mg at 01/11/25 0747     Data     Laboratory:  Recent Labs   Lab 01/12/25  0635 01/11/25  0636 01/09/25  1009   WBC 3.3* 3.1* 8.1   HGB 10.7* 11.0* 14.9   HCT 31.4* 32.6* 44.1   MCV 88 88 88    181 382     Recent Labs   Lab 01/12/25  0635 01/11/25  1623 01/11/25  0636 01/10/25  1714 01/10/25  0654 01/09/25  1804  "01/09/25  1804 01/09/25  1009   NA  --   --  139  --  139  --  138 139   POTASSIUM  --  3.5 2.7*  --  3.6  --  4.2 4.2   CHLORIDE  --   --  105  --  102  --  99 85*   CO2  --   --  23  --  23  --  16* 11*   ANIONGAP  --   --  11  --  14  --  23* 43*   GLC  --   --  124*  --  128*  --  130* 137*   BUN  --   --  4.3*  --  13.2  --  17.9 23.2*   CR  --   --  0.76  --  0.93  --  1.01* 1.66*   GFRESTIMATED  --   --  >90  --  83  --  75 42*   DERICK  --   --  8.0*  --  8.5*  --  8.5* 10.4   MAG 2.0 2.6* 1.4*  --  1.9  --   --  2.0   PHOS 2.7  --  2.2* 2.1* 0.6*   < >  --   --    PROTTOTAL  --   --  5.8*  --  6.9  --   --  10.0*   ALBUMIN  --   --  3.4*  --  4.0  --   --  5.3*   BILITOTAL  --   --  0.5  --  0.6  --   --  0.3   ALKPHOS  --   --  77  --  84  --   --  136   AST  --   --  229*  --  49*  --   --  56*   ALT  --   --  131*  --  27  --   --  35    < > = values in this interval not displayed.     No results for input(s): \"CULT\" in the last 168 hours.  No results found for: \"TROPI\"    Imaging:  Recent Results (from the past 24 hours)   US Abdomen Limited    Narrative    EXAM: ULTRASOUND ABDOMEN LIMITED  LOCATION: [facility]  DATE: 01/11/2025    INDICATION: Elevated liver function tests. Vomiting. Epigastric pain.  COMPARISON: CT of the abdomen and pelvis 04/05/2024.  TECHNIQUE: Limited abdominal ultrasound.    FINDINGS:    GALLBLADDER: Normal. No gallstones, wall thickening, or pericholecystic fluid. Negative sonographic Douglas's sign.    BILE DUCTS: No biliary dilatation. The common duct measures 2 mm. Portions of the common duct could not be visualized due to overlying bowel gas.     LIVER: Hepatic steatosis. No focal mass.    RIGHT KIDNEY: No hydronephrosis.    PANCREAS: The visualized portions are unremarkable.    No ascites.      Impression    IMPRESSION:  1.  Hepatic steatosis.  2.  Otherwise, unremarkable right upper quadrant ultrasound. No biliary dilatation.           Timi Toledo, "   [facility] Hospitalist  [address] [place] [address]  Securely message with [employer] (more info)  Text page via [facility] Paging/Directory   01/12/2025

## 2025-01-12 NOTE — PLAN OF CARE
"Goal Outcome Evaluation:      Plan of Care Reviewed With: patient    Overall Patient Progress: improving    Outcome Evaluation:     Alert and oriented x 4 .  on  RA high BP  Calm and cooperative . Denied nausea or abdominal pain .  Abdomen is soft , +bowel sounds. Scheduled  pantoprazole provided .     Problem: Adult Inpatient Plan of Care  Goal: Plan of Care Review  Description: The Plan of Care Review/Shift note should be completed every shift.  The Outcome Evaluation is a brief statement about your assessment that the patient is improving, declining, or no change.  This information will be displayed automatically on your shift  note.  Outcome: Progressing  Flowsheets (Taken 1/12/2025 0256)  Outcome Evaluation: vs  Plan of Care Reviewed With: patient  Overall Patient Progress: improving  Goal: Patient-Specific Goal (Individualized)  Description: You can add care plan individualizations to a care plan. Examples of Individualization might be:  \"Parent requests to be called daily at 9am for status\", \"I have a hard time hearing out of my right ear\", or \"Do not touch me to wake me up as it startles  me\".  Outcome: Progressing  Goal: Absence of Hospital-Acquired Illness or Injury  Outcome: Progressing  Intervention: Identify and Manage Fall Risk  Recent Flowsheet Documentation  Taken 1/11/2025 1946 by Jc Jackson, RN  Safety Promotion/Fall Prevention: safety round/check completed  Taken 1/11/2025 1920 by Jc Jackson, RN  Safety Promotion/Fall Prevention:   treat underlying cause   safety round/check completed   room organization consistent   room near nurse's station   clutter free environment maintained   lighting adjusted   nonskid shoes/slippers when out of bed   patient and family education  Intervention: Prevent Skin Injury  Recent Flowsheet Documentation  Taken 1/11/2025 1920 by Jc Jackson, RN  Body Position: position changed independently  Goal: Optimal Comfort and Wellbeing  Outcome: " Progressing  Goal: Readiness for Transition of Care  Outcome: Progressing

## 2025-01-12 NOTE — PROGRESS NOTES
Pt is in stable condition, vss, no co pain/cp/sob.  Pt dc home today.  All dc education reviewed with pt in regards to: diet, activity, safety, s/s to report, medications and rx, follow up appointments/care, the need to return to the lab on Monday, drink adequate water, etc.  Questions were answered and pt verbalized understanding.  All belongings were sent with pt, pt declined to dc via wc with staff to main entrance and preferred to walk herself out, private transport awaiting pt.

## 2025-01-13 ENCOUNTER — TELEPHONE (OUTPATIENT)
Dept: FAMILY MEDICINE | Facility: CLINIC | Age: 35
End: 2025-01-13

## 2025-01-13 ENCOUNTER — LAB (OUTPATIENT)
Dept: LAB | Facility: CLINIC | Age: 35
End: 2025-01-13
Payer: COMMERCIAL

## 2025-01-13 DIAGNOSIS — R74.01 NONSPECIFIC ELEVATION OF LEVELS OF TRANSAMINASE AND LACTIC ACID DEHYDROGENASE (LDH): Primary | ICD-10-CM

## 2025-01-13 DIAGNOSIS — R74.02 NONSPECIFIC ELEVATION OF LEVELS OF TRANSAMINASE AND LACTIC ACID DEHYDROGENASE (LDH): Primary | ICD-10-CM

## 2025-01-13 DIAGNOSIS — F10.10 ALCOHOL ABUSE: ICD-10-CM

## 2025-01-13 DIAGNOSIS — K76.0 FATTY LIVER: ICD-10-CM

## 2025-01-13 LAB
ALBUMIN SERPL BCG-MCNC: 3.8 G/DL (ref 3.5–5.2)
ALP SERPL-CCNC: 107 U/L (ref 40–150)
ALT SERPL W P-5'-P-CCNC: 595 U/L (ref 0–50)
AST SERPL W P-5'-P-CCNC: 649 U/L (ref 0–45)
BILIRUB DIRECT SERPL-MCNC: <0.2 MG/DL (ref 0–0.3)
BILIRUB SERPL-MCNC: 0.3 MG/DL
PROT SERPL-MCNC: 6.9 G/DL (ref 6.4–8.3)

## 2025-01-13 PROCEDURE — 82040 ASSAY OF SERUM ALBUMIN: CPT

## 2025-01-13 PROCEDURE — 36415 COLL VENOUS BLD VENIPUNCTURE: CPT

## 2025-01-13 PROCEDURE — 80076 HEPATIC FUNCTION PANEL: CPT

## 2025-01-13 NOTE — TELEPHONE ENCOUNTER
Patient was discharged yesterday on 1/12 with alcohol use disorder with alcohol withdrawal and nausea and vomiting and decreased oral intake.  Her liver enzymes were trending up at the time of discharge.  Repeat lab test today showed persistently rising LFTs.  Suspected yesterday that her LFTs were elevated secondary to poor oral intake and recent alcohol abuse.  Not on any medications that would contribute to elevated LFTs.  Patient contacted via phone.  She states yesterday she was little nauseous but ate some food and felt better.  Today she denies any nausea or vomiting, abdominal pain.  She has not taken any Tylenol in the last few days.  Options were discussed with the patient.  It was recommended the patient return to the emergency department for evaluation and a gastroenterology evaluation.  Patient would prefer not to come back to the hospital and asked if labs could be rechecked as an outpatient.  Will order repeat LFTs for Wednesday 1/15.  Patient was encouraged to return to the emergency department if her nausea or vomiting returned, she develops abdominal pain, fevers or chills, bloating.  All questions were answered.  A PCP referral was placed prior to discharge and she was encouraged to follow up with them.

## 2025-01-15 ENCOUNTER — LAB (OUTPATIENT)
Dept: LAB | Facility: CLINIC | Age: 35
End: 2025-01-15
Payer: COMMERCIAL

## 2025-01-15 DIAGNOSIS — R74.02 NONSPECIFIC ELEVATION OF LEVELS OF TRANSAMINASE AND LACTIC ACID DEHYDROGENASE (LDH): ICD-10-CM

## 2025-01-15 DIAGNOSIS — R74.01 NONSPECIFIC ELEVATION OF LEVELS OF TRANSAMINASE AND LACTIC ACID DEHYDROGENASE (LDH): ICD-10-CM

## 2025-01-15 LAB
ALBUMIN SERPL BCG-MCNC: 3.7 G/DL (ref 3.5–5.2)
ALP SERPL-CCNC: 115 U/L (ref 40–150)
ALT SERPL W P-5'-P-CCNC: 417 U/L (ref 0–50)
AST SERPL W P-5'-P-CCNC: 211 U/L (ref 0–45)
BILIRUB DIRECT SERPL-MCNC: <0.2 MG/DL (ref 0–0.3)
BILIRUB SERPL-MCNC: 0.2 MG/DL
PROT SERPL-MCNC: 6.6 G/DL (ref 6.4–8.3)

## 2025-01-15 PROCEDURE — 84155 ASSAY OF PROTEIN SERUM: CPT

## 2025-01-15 PROCEDURE — 36415 COLL VENOUS BLD VENIPUNCTURE: CPT
